# Patient Record
Sex: FEMALE | Race: BLACK OR AFRICAN AMERICAN | NOT HISPANIC OR LATINO | URBAN - METROPOLITAN AREA
[De-identification: names, ages, dates, MRNs, and addresses within clinical notes are randomized per-mention and may not be internally consistent; named-entity substitution may affect disease eponyms.]

---

## 2017-01-28 ENCOUNTER — RECORDS - HEALTHEAST (OUTPATIENT)
Dept: LAB | Facility: CLINIC | Age: 67
End: 2017-01-28

## 2017-01-28 LAB
CREAT SERPL-MCNC: 0.87 MG/DL (ref 0.6–1.1)
GFR SERPL CREATININE-BSD FRML MDRD: >60 ML/MIN/1.73M2

## 2017-01-30 ENCOUNTER — RECORDS - HEALTHEAST (OUTPATIENT)
Dept: LAB | Facility: CLINIC | Age: 67
End: 2017-01-30

## 2017-01-31 LAB
CREAT SERPL-MCNC: 0.77 MG/DL (ref 0.6–1.1)
GFR SERPL CREATININE-BSD FRML MDRD: >60 ML/MIN/1.73M2

## 2017-02-03 ENCOUNTER — RECORDS - HEALTHEAST (OUTPATIENT)
Dept: LAB | Facility: CLINIC | Age: 67
End: 2017-02-03

## 2017-02-06 LAB
CREAT SERPL-MCNC: 0.88 MG/DL (ref 0.6–1.1)
GFR SERPL CREATININE-BSD FRML MDRD: >60 ML/MIN/1.73M2

## 2017-03-07 ENCOUNTER — RECORDS - HEALTHEAST (OUTPATIENT)
Dept: LAB | Facility: CLINIC | Age: 67
End: 2017-03-07

## 2017-03-08 LAB — HBA1C MFR BLD: 8.3 % (ref 4.2–6.1)

## 2017-11-10 ENCOUNTER — RECORDS - HEALTHEAST (OUTPATIENT)
Dept: ADMINISTRATIVE | Facility: OTHER | Age: 67
End: 2017-11-10

## 2017-11-15 ENCOUNTER — RECORDS - HEALTHEAST (OUTPATIENT)
Dept: ADMINISTRATIVE | Facility: OTHER | Age: 67
End: 2017-11-15

## 2017-11-16 ENCOUNTER — RECORDS - HEALTHEAST (OUTPATIENT)
Dept: ADMINISTRATIVE | Facility: OTHER | Age: 67
End: 2017-11-16

## 2017-11-30 ENCOUNTER — OFFICE VISIT - HEALTHEAST (OUTPATIENT)
Dept: FAMILY MEDICINE | Facility: CLINIC | Age: 67
End: 2017-11-30

## 2017-11-30 ENCOUNTER — COMMUNICATION - HEALTHEAST (OUTPATIENT)
Dept: FAMILY MEDICINE | Facility: CLINIC | Age: 67
End: 2017-11-30

## 2017-11-30 ENCOUNTER — AMBULATORY - HEALTHEAST (OUTPATIENT)
Dept: CARE COORDINATION | Facility: CLINIC | Age: 67
End: 2017-11-30

## 2017-11-30 DIAGNOSIS — E11.9 DM (DIABETES MELLITUS) (H): ICD-10-CM

## 2017-11-30 DIAGNOSIS — K21.9 CHRONIC GERD: ICD-10-CM

## 2017-11-30 DIAGNOSIS — I10 HYPERTENSION: ICD-10-CM

## 2017-11-30 DIAGNOSIS — I48.91 A-FIB (H): ICD-10-CM

## 2017-11-30 DIAGNOSIS — E66.01 MORBID OBESITY (H): ICD-10-CM

## 2017-12-04 ENCOUNTER — COMMUNICATION - HEALTHEAST (OUTPATIENT)
Dept: FAMILY MEDICINE | Facility: CLINIC | Age: 67
End: 2017-12-04

## 2017-12-04 ENCOUNTER — COMMUNICATION - HEALTHEAST (OUTPATIENT)
Dept: NURSING | Facility: CLINIC | Age: 67
End: 2017-12-04

## 2017-12-04 DIAGNOSIS — G47.33 OSA (OBSTRUCTIVE SLEEP APNEA): ICD-10-CM

## 2017-12-07 ENCOUNTER — COMMUNICATION - HEALTHEAST (OUTPATIENT)
Dept: FAMILY MEDICINE | Facility: CLINIC | Age: 67
End: 2017-12-07

## 2017-12-08 ENCOUNTER — COMMUNICATION - HEALTHEAST (OUTPATIENT)
Dept: FAMILY MEDICINE | Facility: CLINIC | Age: 67
End: 2017-12-08

## 2017-12-11 ENCOUNTER — COMMUNICATION - HEALTHEAST (OUTPATIENT)
Dept: FAMILY MEDICINE | Facility: CLINIC | Age: 67
End: 2017-12-11

## 2017-12-14 ENCOUNTER — COMMUNICATION - HEALTHEAST (OUTPATIENT)
Dept: NURSING | Facility: CLINIC | Age: 67
End: 2017-12-14

## 2017-12-14 ENCOUNTER — OFFICE VISIT - HEALTHEAST (OUTPATIENT)
Dept: FAMILY MEDICINE | Facility: CLINIC | Age: 67
End: 2017-12-14

## 2017-12-14 DIAGNOSIS — E66.3 OVERWEIGHT: ICD-10-CM

## 2017-12-14 DIAGNOSIS — I10 HYPERTENSION: ICD-10-CM

## 2017-12-14 DIAGNOSIS — E11.9 DIABETES MELLITUS (H): ICD-10-CM

## 2017-12-20 ENCOUNTER — AMBULATORY - HEALTHEAST (OUTPATIENT)
Dept: CARE COORDINATION | Facility: CLINIC | Age: 67
End: 2017-12-20

## 2017-12-21 ENCOUNTER — COMMUNICATION - HEALTHEAST (OUTPATIENT)
Dept: CARE COORDINATION | Facility: CLINIC | Age: 67
End: 2017-12-21

## 2017-12-26 ENCOUNTER — COMMUNICATION - HEALTHEAST (OUTPATIENT)
Dept: FAMILY MEDICINE | Facility: CLINIC | Age: 67
End: 2017-12-26

## 2018-01-02 ENCOUNTER — COMMUNICATION - HEALTHEAST (OUTPATIENT)
Dept: FAMILY MEDICINE | Facility: CLINIC | Age: 68
End: 2018-01-02

## 2018-01-08 ENCOUNTER — COMMUNICATION - HEALTHEAST (OUTPATIENT)
Dept: FAMILY MEDICINE | Facility: CLINIC | Age: 68
End: 2018-01-08

## 2018-01-10 ENCOUNTER — COMMUNICATION - HEALTHEAST (OUTPATIENT)
Dept: FAMILY MEDICINE | Facility: CLINIC | Age: 68
End: 2018-01-10

## 2018-01-10 DIAGNOSIS — I10 HTN (HYPERTENSION): ICD-10-CM

## 2018-01-10 DIAGNOSIS — E11.9 DM (DIABETES MELLITUS) (H): ICD-10-CM

## 2018-01-10 DIAGNOSIS — E78.5 HYPERLIPIDEMIA: ICD-10-CM

## 2018-01-11 ENCOUNTER — COMMUNICATION - HEALTHEAST (OUTPATIENT)
Dept: NURSING | Facility: CLINIC | Age: 68
End: 2018-01-11

## 2018-01-11 ENCOUNTER — COMMUNICATION - HEALTHEAST (OUTPATIENT)
Dept: FAMILY MEDICINE | Facility: CLINIC | Age: 68
End: 2018-01-11

## 2018-01-12 ENCOUNTER — COMMUNICATION - HEALTHEAST (OUTPATIENT)
Dept: FAMILY MEDICINE | Facility: CLINIC | Age: 68
End: 2018-01-12

## 2018-01-12 DIAGNOSIS — K21.9 GERD (GASTROESOPHAGEAL REFLUX DISEASE): ICD-10-CM

## 2018-01-18 ENCOUNTER — COMMUNICATION - HEALTHEAST (OUTPATIENT)
Dept: NURSING | Facility: CLINIC | Age: 68
End: 2018-01-18

## 2018-01-18 ENCOUNTER — RECORDS - HEALTHEAST (OUTPATIENT)
Dept: GENERAL RADIOLOGY | Facility: CLINIC | Age: 68
End: 2018-01-18

## 2018-01-18 ENCOUNTER — OFFICE VISIT - HEALTHEAST (OUTPATIENT)
Dept: FAMILY MEDICINE | Facility: CLINIC | Age: 68
End: 2018-01-18

## 2018-01-18 ENCOUNTER — AMBULATORY - HEALTHEAST (OUTPATIENT)
Dept: PODIATRY | Age: 68
End: 2018-01-18

## 2018-01-18 DIAGNOSIS — E11.9 DIABETES MELLITUS (H): ICD-10-CM

## 2018-01-18 DIAGNOSIS — G47.33 OSA (OBSTRUCTIVE SLEEP APNEA): ICD-10-CM

## 2018-01-18 DIAGNOSIS — E11.9 TYPE 2 DIABETES MELLITUS WITHOUT COMPLICATIONS (H): ICD-10-CM

## 2018-01-18 DIAGNOSIS — M79.673 PAIN OF FOOT, UNSPECIFIED LATERALITY: ICD-10-CM

## 2018-01-18 DIAGNOSIS — E11.49 TYPE 2 DIABETES MELLITUS WITH NEUROLOGICAL MANIFESTATIONS (H): ICD-10-CM

## 2018-01-18 DIAGNOSIS — L60.2 ONYCHAUXIS: ICD-10-CM

## 2018-01-18 DIAGNOSIS — E78.5 HYPERLIPIDEMIA: ICD-10-CM

## 2018-01-18 DIAGNOSIS — E11.9 DM (DIABETES MELLITUS) (H): ICD-10-CM

## 2018-01-18 DIAGNOSIS — M25.572 LEFT ANKLE PAIN: ICD-10-CM

## 2018-01-18 DIAGNOSIS — I10 HTN (HYPERTENSION): ICD-10-CM

## 2018-01-18 DIAGNOSIS — E66.01 MORBID OBESITY (H): ICD-10-CM

## 2018-01-18 LAB
ERYTHROCYTE [DISTWIDTH] IN BLOOD BY AUTOMATED COUNT: 13.1 % (ref 11–14.5)
HBA1C MFR BLD: 7.1 % (ref 3.5–6)
HCT VFR BLD AUTO: 42.4 % (ref 35–47)
HGB BLD-MCNC: 14.2 G/DL (ref 12–16)
MCH RBC QN AUTO: 30.4 PG (ref 27–34)
MCHC RBC AUTO-ENTMCNC: 33.5 G/DL (ref 32–36)
MCV RBC AUTO: 91 FL (ref 80–100)
PLATELET # BLD AUTO: 302 THOU/UL (ref 140–440)
PMV BLD AUTO: 9 FL (ref 7–10)
RBC # BLD AUTO: 4.67 MILL/UL (ref 3.8–5.4)
WBC: 7.1 THOU/UL (ref 4–11)

## 2018-01-19 ENCOUNTER — COMMUNICATION - HEALTHEAST (OUTPATIENT)
Dept: FAMILY MEDICINE | Facility: CLINIC | Age: 68
End: 2018-01-19

## 2018-01-19 LAB
ALBUMIN SERPL-MCNC: 3.4 G/DL (ref 3.5–5)
ALP SERPL-CCNC: 161 U/L (ref 45–120)
ALT SERPL W P-5'-P-CCNC: 9 U/L (ref 0–45)
ANION GAP SERPL CALCULATED.3IONS-SCNC: 12 MMOL/L (ref 5–18)
AST SERPL W P-5'-P-CCNC: 16 U/L (ref 0–40)
BILIRUB SERPL-MCNC: 0.4 MG/DL (ref 0–1)
BUN SERPL-MCNC: 24 MG/DL (ref 8–22)
CALCIUM SERPL-MCNC: 9.4 MG/DL (ref 8.5–10.5)
CHLORIDE BLD-SCNC: 109 MMOL/L (ref 98–107)
CHOLEST SERPL-MCNC: 172 MG/DL
CO2 SERPL-SCNC: 20 MMOL/L (ref 22–31)
CREAT SERPL-MCNC: 1.14 MG/DL (ref 0.6–1.1)
FASTING STATUS PATIENT QL REPORTED: NO
GFR SERPL CREATININE-BSD FRML MDRD: 47 ML/MIN/1.73M2
GLUCOSE BLD-MCNC: 137 MG/DL (ref 70–125)
HDLC SERPL-MCNC: 50 MG/DL
LDLC SERPL CALC-MCNC: 96 MG/DL
POTASSIUM BLD-SCNC: 4.5 MMOL/L (ref 3.5–5)
PROT SERPL-MCNC: 6.9 G/DL (ref 6–8)
SODIUM SERPL-SCNC: 141 MMOL/L (ref 136–145)
TRIGL SERPL-MCNC: 131 MG/DL

## 2018-01-24 ENCOUNTER — COMMUNICATION - HEALTHEAST (OUTPATIENT)
Dept: FAMILY MEDICINE | Facility: CLINIC | Age: 68
End: 2018-01-24

## 2018-01-25 ENCOUNTER — COMMUNICATION - HEALTHEAST (OUTPATIENT)
Dept: FAMILY MEDICINE | Facility: CLINIC | Age: 68
End: 2018-01-25

## 2018-02-06 ENCOUNTER — COMMUNICATION - HEALTHEAST (OUTPATIENT)
Dept: FAMILY MEDICINE | Facility: CLINIC | Age: 68
End: 2018-02-06

## 2018-02-06 DIAGNOSIS — I10 HTN (HYPERTENSION): ICD-10-CM

## 2018-02-08 ENCOUNTER — RECORDS - HEALTHEAST (OUTPATIENT)
Dept: ADMINISTRATIVE | Facility: OTHER | Age: 68
End: 2018-02-08

## 2018-02-12 ENCOUNTER — COMMUNICATION - HEALTHEAST (OUTPATIENT)
Dept: NURSING | Facility: CLINIC | Age: 68
End: 2018-02-12

## 2018-02-12 DIAGNOSIS — E66.9 OBESITY: ICD-10-CM

## 2018-02-28 ENCOUNTER — COMMUNICATION - HEALTHEAST (OUTPATIENT)
Dept: FAMILY MEDICINE | Facility: CLINIC | Age: 68
End: 2018-02-28

## 2018-03-01 ENCOUNTER — COMMUNICATION - HEALTHEAST (OUTPATIENT)
Dept: NURSING | Facility: CLINIC | Age: 68
End: 2018-03-01

## 2018-03-07 ENCOUNTER — COMMUNICATION - HEALTHEAST (OUTPATIENT)
Dept: FAMILY MEDICINE | Facility: CLINIC | Age: 68
End: 2018-03-07

## 2018-03-23 ENCOUNTER — COMMUNICATION - HEALTHEAST (OUTPATIENT)
Dept: NURSING | Facility: CLINIC | Age: 68
End: 2018-03-23

## 2018-04-02 ENCOUNTER — COMMUNICATION - HEALTHEAST (OUTPATIENT)
Dept: FAMILY MEDICINE | Facility: CLINIC | Age: 68
End: 2018-04-02

## 2018-04-04 ENCOUNTER — COMMUNICATION - HEALTHEAST (OUTPATIENT)
Dept: FAMILY MEDICINE | Facility: CLINIC | Age: 68
End: 2018-04-04

## 2018-04-10 ENCOUNTER — COMMUNICATION - HEALTHEAST (OUTPATIENT)
Dept: FAMILY MEDICINE | Facility: CLINIC | Age: 68
End: 2018-04-10

## 2018-04-19 ENCOUNTER — OFFICE VISIT - HEALTHEAST (OUTPATIENT)
Dept: FAMILY MEDICINE | Facility: CLINIC | Age: 68
End: 2018-04-19

## 2018-04-19 DIAGNOSIS — R29.898 MUSCULAR DECONDITIONING: ICD-10-CM

## 2018-04-20 ENCOUNTER — COMMUNICATION - HEALTHEAST (OUTPATIENT)
Dept: NURSING | Facility: CLINIC | Age: 68
End: 2018-04-20

## 2018-04-27 ENCOUNTER — COMMUNICATION - HEALTHEAST (OUTPATIENT)
Dept: NURSING | Facility: CLINIC | Age: 68
End: 2018-04-27

## 2018-04-30 ENCOUNTER — COMMUNICATION - HEALTHEAST (OUTPATIENT)
Dept: FAMILY MEDICINE | Facility: CLINIC | Age: 68
End: 2018-04-30

## 2018-05-03 ENCOUNTER — COMMUNICATION - HEALTHEAST (OUTPATIENT)
Dept: NURSING | Facility: CLINIC | Age: 68
End: 2018-05-03

## 2018-05-03 ENCOUNTER — OFFICE VISIT - HEALTHEAST (OUTPATIENT)
Dept: FAMILY MEDICINE | Facility: CLINIC | Age: 68
End: 2018-05-03

## 2018-05-03 DIAGNOSIS — E11.9 DM (DIABETES MELLITUS) (H): ICD-10-CM

## 2018-05-03 DIAGNOSIS — I10 HTN (HYPERTENSION): ICD-10-CM

## 2018-05-03 DIAGNOSIS — E78.5 HYPERLIPIDEMIA: ICD-10-CM

## 2018-05-03 DIAGNOSIS — R32 URINARY INCONTINENCE: ICD-10-CM

## 2018-05-03 DIAGNOSIS — E66.01 MORBID OBESITY (H): ICD-10-CM

## 2018-05-03 LAB
ANION GAP SERPL CALCULATED.3IONS-SCNC: 10 MMOL/L (ref 5–18)
BUN SERPL-MCNC: 21 MG/DL (ref 8–22)
CALCIUM SERPL-MCNC: 8.9 MG/DL (ref 8.5–10.5)
CHLORIDE BLD-SCNC: 109 MMOL/L (ref 98–107)
CHOLEST SERPL-MCNC: 180 MG/DL
CO2 SERPL-SCNC: 22 MMOL/L (ref 22–31)
CREAT SERPL-MCNC: 0.93 MG/DL (ref 0.6–1.1)
ERYTHROCYTE [DISTWIDTH] IN BLOOD BY AUTOMATED COUNT: 12.9 % (ref 11–14.5)
FASTING STATUS PATIENT QL REPORTED: ABNORMAL
GFR SERPL CREATININE-BSD FRML MDRD: 60 ML/MIN/1.73M2
GLUCOSE BLD-MCNC: 180 MG/DL (ref 70–125)
HBA1C MFR BLD: 7.7 % (ref 3.5–6)
HCT VFR BLD AUTO: 40.1 % (ref 35–47)
HDLC SERPL-MCNC: 44 MG/DL
HGB BLD-MCNC: 13.6 G/DL (ref 12–16)
LDLC SERPL CALC-MCNC: 107 MG/DL
MCH RBC QN AUTO: 30.5 PG (ref 27–34)
MCHC RBC AUTO-ENTMCNC: 33.9 G/DL (ref 32–36)
MCV RBC AUTO: 90 FL (ref 80–100)
PLATELET # BLD AUTO: 239 THOU/UL (ref 140–440)
PMV BLD AUTO: 9 FL (ref 7–10)
POTASSIUM BLD-SCNC: 4.1 MMOL/L (ref 3.5–5)
RBC # BLD AUTO: 4.45 MILL/UL (ref 3.8–5.4)
SODIUM SERPL-SCNC: 141 MMOL/L (ref 136–145)
TRIGL SERPL-MCNC: 145 MG/DL
WBC: 7.3 THOU/UL (ref 4–11)

## 2018-05-03 ASSESSMENT — MIFFLIN-ST. JEOR: SCORE: 2260.81

## 2018-05-08 ENCOUNTER — COMMUNICATION - HEALTHEAST (OUTPATIENT)
Dept: ADMINISTRATIVE | Facility: CLINIC | Age: 68
End: 2018-05-08

## 2018-05-09 ENCOUNTER — COMMUNICATION - HEALTHEAST (OUTPATIENT)
Dept: FAMILY MEDICINE | Facility: CLINIC | Age: 68
End: 2018-05-09

## 2018-05-14 ENCOUNTER — COMMUNICATION - HEALTHEAST (OUTPATIENT)
Dept: FAMILY MEDICINE | Facility: CLINIC | Age: 68
End: 2018-05-14

## 2018-05-17 ENCOUNTER — COMMUNICATION - HEALTHEAST (OUTPATIENT)
Dept: FAMILY MEDICINE | Facility: CLINIC | Age: 68
End: 2018-05-17

## 2018-05-18 ENCOUNTER — COMMUNICATION - HEALTHEAST (OUTPATIENT)
Dept: ADMINISTRATIVE | Facility: CLINIC | Age: 68
End: 2018-05-18

## 2018-05-18 ENCOUNTER — COMMUNICATION - HEALTHEAST (OUTPATIENT)
Dept: FAMILY MEDICINE | Facility: CLINIC | Age: 68
End: 2018-05-18

## 2018-05-23 ENCOUNTER — COMMUNICATION - HEALTHEAST (OUTPATIENT)
Dept: FAMILY MEDICINE | Facility: CLINIC | Age: 68
End: 2018-05-23

## 2018-05-23 ENCOUNTER — COMMUNICATION - HEALTHEAST (OUTPATIENT)
Dept: SCHEDULING | Facility: CLINIC | Age: 68
End: 2018-05-23

## 2018-05-31 ENCOUNTER — COMMUNICATION - HEALTHEAST (OUTPATIENT)
Dept: INTERNAL MEDICINE | Facility: CLINIC | Age: 68
End: 2018-05-31

## 2018-05-31 ENCOUNTER — OFFICE VISIT - HEALTHEAST (OUTPATIENT)
Dept: INTERNAL MEDICINE | Facility: CLINIC | Age: 68
End: 2018-05-31

## 2018-05-31 ENCOUNTER — RECORDS - HEALTHEAST (OUTPATIENT)
Dept: ADMINISTRATIVE | Facility: OTHER | Age: 68
End: 2018-05-31

## 2018-05-31 DIAGNOSIS — E11.9 DM (DIABETES MELLITUS) (H): ICD-10-CM

## 2018-05-31 DIAGNOSIS — I10 ESSENTIAL HYPERTENSION: ICD-10-CM

## 2018-05-31 DIAGNOSIS — L98.9 SKIN LESIONS: ICD-10-CM

## 2018-05-31 DIAGNOSIS — R41.3 MEMORY LOSS: ICD-10-CM

## 2018-05-31 DIAGNOSIS — E78.5 HYPERLIPIDEMIA, UNSPECIFIED HYPERLIPIDEMIA TYPE: ICD-10-CM

## 2018-05-31 LAB
ALBUMIN SERPL-MCNC: 3.1 G/DL (ref 3.5–5)
ALP SERPL-CCNC: 172 U/L (ref 45–120)
ALT SERPL W P-5'-P-CCNC: 10 U/L (ref 0–45)
ANION GAP SERPL CALCULATED.3IONS-SCNC: 13 MMOL/L (ref 5–18)
AST SERPL W P-5'-P-CCNC: 12 U/L (ref 0–40)
BILIRUB SERPL-MCNC: 0.4 MG/DL (ref 0–1)
BUN SERPL-MCNC: 21 MG/DL (ref 8–22)
CALCIUM SERPL-MCNC: 8.7 MG/DL (ref 8.5–10.5)
CHLORIDE BLD-SCNC: 106 MMOL/L (ref 98–107)
CHOLEST SERPL-MCNC: 185 MG/DL
CO2 SERPL-SCNC: 21 MMOL/L (ref 22–31)
CREAT SERPL-MCNC: 1.24 MG/DL (ref 0.6–1.1)
FASTING STATUS PATIENT QL REPORTED: YES
GFR SERPL CREATININE-BSD FRML MDRD: 43 ML/MIN/1.73M2
GLUCOSE BLD-MCNC: 361 MG/DL (ref 70–125)
HBA1C MFR BLD: 8.7 % (ref 3.5–6)
HDLC SERPL-MCNC: 39 MG/DL
LDLC SERPL CALC-MCNC: 108 MG/DL
POTASSIUM BLD-SCNC: 4.1 MMOL/L (ref 3.5–5)
PROT SERPL-MCNC: 6.8 G/DL (ref 6–8)
SODIUM SERPL-SCNC: 140 MMOL/L (ref 136–145)
TRIGL SERPL-MCNC: 188 MG/DL

## 2018-06-01 ENCOUNTER — COMMUNICATION - HEALTHEAST (OUTPATIENT)
Dept: INTERNAL MEDICINE | Facility: CLINIC | Age: 68
End: 2018-06-01

## 2018-06-05 ENCOUNTER — COMMUNICATION - HEALTHEAST (OUTPATIENT)
Dept: NURSING | Facility: CLINIC | Age: 68
End: 2018-06-05

## 2018-06-07 ENCOUNTER — RECORDS - HEALTHEAST (OUTPATIENT)
Dept: ADMINISTRATIVE | Facility: OTHER | Age: 68
End: 2018-06-07

## 2018-06-08 ENCOUNTER — COMMUNICATION - HEALTHEAST (OUTPATIENT)
Dept: FAMILY MEDICINE | Facility: CLINIC | Age: 68
End: 2018-06-08

## 2018-06-08 ENCOUNTER — AMBULATORY - HEALTHEAST (OUTPATIENT)
Dept: INTERNAL MEDICINE | Facility: CLINIC | Age: 68
End: 2018-06-08

## 2018-06-08 ENCOUNTER — COMMUNICATION - HEALTHEAST (OUTPATIENT)
Dept: INTERNAL MEDICINE | Facility: CLINIC | Age: 68
End: 2018-06-08

## 2018-07-19 ENCOUNTER — COMMUNICATION - HEALTHEAST (OUTPATIENT)
Dept: INTERNAL MEDICINE | Facility: CLINIC | Age: 68
End: 2018-07-19

## 2018-07-19 DIAGNOSIS — I10 HTN (HYPERTENSION): ICD-10-CM

## 2018-07-19 DIAGNOSIS — E11.9 DM (DIABETES MELLITUS) (H): ICD-10-CM

## 2018-07-21 ENCOUNTER — COMMUNICATION - HEALTHEAST (OUTPATIENT)
Dept: INTERNAL MEDICINE | Facility: CLINIC | Age: 68
End: 2018-07-21

## 2018-07-21 DIAGNOSIS — I10 HTN (HYPERTENSION): ICD-10-CM

## 2018-07-21 DIAGNOSIS — E11.9 DM (DIABETES MELLITUS) (H): ICD-10-CM

## 2018-08-01 ENCOUNTER — COMMUNICATION - HEALTHEAST (OUTPATIENT)
Dept: INTERNAL MEDICINE | Facility: CLINIC | Age: 68
End: 2018-08-01

## 2018-08-06 ENCOUNTER — COMMUNICATION - HEALTHEAST (OUTPATIENT)
Dept: FAMILY MEDICINE | Facility: CLINIC | Age: 68
End: 2018-08-06

## 2018-08-08 ENCOUNTER — COMMUNICATION - HEALTHEAST (OUTPATIENT)
Dept: FAMILY MEDICINE | Facility: CLINIC | Age: 68
End: 2018-08-08

## 2018-08-22 ENCOUNTER — RECORDS - HEALTHEAST (OUTPATIENT)
Dept: ADMINISTRATIVE | Facility: OTHER | Age: 68
End: 2018-08-22

## 2018-08-24 ENCOUNTER — COMMUNICATION - HEALTHEAST (OUTPATIENT)
Dept: INTERNAL MEDICINE | Facility: CLINIC | Age: 68
End: 2018-08-24

## 2018-08-24 DIAGNOSIS — I10 HTN (HYPERTENSION): ICD-10-CM

## 2018-09-05 ENCOUNTER — COMMUNICATION - HEALTHEAST (OUTPATIENT)
Dept: INTERNAL MEDICINE | Facility: CLINIC | Age: 68
End: 2018-09-05

## 2018-09-18 ENCOUNTER — COMMUNICATION - HEALTHEAST (OUTPATIENT)
Dept: INTERNAL MEDICINE | Facility: CLINIC | Age: 68
End: 2018-09-18

## 2018-09-18 ENCOUNTER — AMBULATORY - HEALTHEAST (OUTPATIENT)
Dept: INTERNAL MEDICINE | Facility: CLINIC | Age: 68
End: 2018-09-18

## 2018-09-18 DIAGNOSIS — B37.2 YEAST INFECTION OF THE SKIN: ICD-10-CM

## 2018-09-20 ENCOUNTER — RECORDS - HEALTHEAST (OUTPATIENT)
Dept: ADMINISTRATIVE | Facility: OTHER | Age: 68
End: 2018-09-20

## 2018-10-01 ENCOUNTER — COMMUNICATION - HEALTHEAST (OUTPATIENT)
Dept: FAMILY MEDICINE | Facility: CLINIC | Age: 68
End: 2018-10-01

## 2018-10-03 ENCOUNTER — COMMUNICATION - HEALTHEAST (OUTPATIENT)
Dept: FAMILY MEDICINE | Facility: CLINIC | Age: 68
End: 2018-10-03

## 2018-10-03 DIAGNOSIS — E78.5 HYPERLIPIDEMIA: ICD-10-CM

## 2018-10-05 ENCOUNTER — RECORDS - HEALTHEAST (OUTPATIENT)
Dept: ADMINISTRATIVE | Facility: OTHER | Age: 68
End: 2018-10-05

## 2018-10-25 ENCOUNTER — COMMUNICATION - HEALTHEAST (OUTPATIENT)
Dept: SCHEDULING | Facility: CLINIC | Age: 68
End: 2018-10-25

## 2018-10-29 ENCOUNTER — COMMUNICATION - HEALTHEAST (OUTPATIENT)
Dept: INTERNAL MEDICINE | Facility: CLINIC | Age: 68
End: 2018-10-29

## 2018-10-29 ENCOUNTER — OFFICE VISIT - HEALTHEAST (OUTPATIENT)
Dept: INTERNAL MEDICINE | Facility: CLINIC | Age: 68
End: 2018-10-29

## 2018-10-29 DIAGNOSIS — R32 URINARY INCONTINENCE: ICD-10-CM

## 2018-10-29 DIAGNOSIS — E78.5 HYPERLIPIDEMIA, UNSPECIFIED HYPERLIPIDEMIA TYPE: ICD-10-CM

## 2018-10-29 DIAGNOSIS — E11.9 DM (DIABETES MELLITUS) (H): ICD-10-CM

## 2018-10-29 DIAGNOSIS — I10 ESSENTIAL HYPERTENSION: ICD-10-CM

## 2018-10-29 LAB — HBA1C MFR BLD: 10 % (ref 3.5–6)

## 2018-10-29 ASSESSMENT — MIFFLIN-ST. JEOR: SCORE: 2259.96

## 2018-10-30 LAB
ALBUMIN SERPL-MCNC: 3.3 G/DL (ref 3.5–5)
ALP SERPL-CCNC: 154 U/L (ref 45–120)
ALT SERPL W P-5'-P-CCNC: <9 U/L (ref 0–45)
ANION GAP SERPL CALCULATED.3IONS-SCNC: 15 MMOL/L (ref 5–18)
AST SERPL W P-5'-P-CCNC: 12 U/L (ref 0–40)
BILIRUB SERPL-MCNC: 0.5 MG/DL (ref 0–1)
BUN SERPL-MCNC: 24 MG/DL (ref 8–22)
CALCIUM SERPL-MCNC: 9.1 MG/DL (ref 8.5–10.5)
CHLORIDE BLD-SCNC: 105 MMOL/L (ref 98–107)
CHOLEST SERPL-MCNC: 176 MG/DL
CO2 SERPL-SCNC: 19 MMOL/L (ref 22–31)
CREAT SERPL-MCNC: 1.57 MG/DL (ref 0.6–1.1)
FASTING STATUS PATIENT QL REPORTED: YES
GFR SERPL CREATININE-BSD FRML MDRD: 33 ML/MIN/1.73M2
GLUCOSE BLD-MCNC: 348 MG/DL (ref 70–125)
HDLC SERPL-MCNC: 40 MG/DL
LDLC SERPL CALC-MCNC: 107 MG/DL
POTASSIUM BLD-SCNC: 4.4 MMOL/L (ref 3.5–5)
PROT SERPL-MCNC: 6.8 G/DL (ref 6–8)
SODIUM SERPL-SCNC: 139 MMOL/L (ref 136–145)
TRIGL SERPL-MCNC: 144 MG/DL

## 2018-10-31 ENCOUNTER — COMMUNICATION - HEALTHEAST (OUTPATIENT)
Dept: INTERNAL MEDICINE | Facility: CLINIC | Age: 68
End: 2018-10-31

## 2018-11-28 ENCOUNTER — RECORDS - HEALTHEAST (OUTPATIENT)
Dept: ADMINISTRATIVE | Facility: OTHER | Age: 68
End: 2018-11-28

## 2018-11-30 ENCOUNTER — RECORDS - HEALTHEAST (OUTPATIENT)
Dept: ADMINISTRATIVE | Facility: OTHER | Age: 68
End: 2018-11-30

## 2018-12-10 ENCOUNTER — COMMUNICATION - HEALTHEAST (OUTPATIENT)
Dept: SCHEDULING | Facility: CLINIC | Age: 68
End: 2018-12-10

## 2018-12-14 ENCOUNTER — COMMUNICATION - HEALTHEAST (OUTPATIENT)
Dept: INTERNAL MEDICINE | Facility: CLINIC | Age: 68
End: 2018-12-14

## 2019-01-06 ENCOUNTER — COMMUNICATION - HEALTHEAST (OUTPATIENT)
Dept: FAMILY MEDICINE | Facility: CLINIC | Age: 69
End: 2019-01-06

## 2019-01-06 DIAGNOSIS — E78.5 HYPERLIPIDEMIA: ICD-10-CM

## 2019-01-08 ENCOUNTER — COMMUNICATION - HEALTHEAST (OUTPATIENT)
Dept: INTERNAL MEDICINE | Facility: CLINIC | Age: 69
End: 2019-01-08

## 2019-01-08 ENCOUNTER — COMMUNICATION - HEALTHEAST (OUTPATIENT)
Dept: NURSING | Facility: CLINIC | Age: 69
End: 2019-01-08

## 2019-01-08 ENCOUNTER — RECORDS - HEALTHEAST (OUTPATIENT)
Dept: ADMINISTRATIVE | Facility: OTHER | Age: 69
End: 2019-01-08

## 2019-01-09 ENCOUNTER — AMBULATORY - HEALTHEAST (OUTPATIENT)
Dept: INTERNAL MEDICINE | Facility: CLINIC | Age: 69
End: 2019-01-09

## 2019-01-09 DIAGNOSIS — R32 URINARY INCONTINENCE: ICD-10-CM

## 2019-01-11 ENCOUNTER — RECORDS - HEALTHEAST (OUTPATIENT)
Dept: ADMINISTRATIVE | Facility: OTHER | Age: 69
End: 2019-01-11

## 2019-01-23 ENCOUNTER — COMMUNICATION - HEALTHEAST (OUTPATIENT)
Dept: INTERNAL MEDICINE | Facility: CLINIC | Age: 69
End: 2019-01-23

## 2019-01-30 ENCOUNTER — COMMUNICATION - HEALTHEAST (OUTPATIENT)
Dept: INTERNAL MEDICINE | Facility: CLINIC | Age: 69
End: 2019-01-30

## 2019-01-31 ENCOUNTER — COMMUNICATION - HEALTHEAST (OUTPATIENT)
Dept: INTERNAL MEDICINE | Facility: CLINIC | Age: 69
End: 2019-01-31

## 2019-02-01 ENCOUNTER — COMMUNICATION - HEALTHEAST (OUTPATIENT)
Dept: NURSING | Facility: CLINIC | Age: 69
End: 2019-02-01

## 2019-02-14 ENCOUNTER — COMMUNICATION - HEALTHEAST (OUTPATIENT)
Dept: NURSING | Facility: CLINIC | Age: 69
End: 2019-02-14

## 2019-03-06 ENCOUNTER — COMMUNICATION - HEALTHEAST (OUTPATIENT)
Dept: NURSING | Facility: CLINIC | Age: 69
End: 2019-03-06

## 2019-03-14 ENCOUNTER — COMMUNICATION - HEALTHEAST (OUTPATIENT)
Dept: NURSING | Facility: CLINIC | Age: 69
End: 2019-03-14

## 2019-04-03 ENCOUNTER — COMMUNICATION - HEALTHEAST (OUTPATIENT)
Dept: INTERNAL MEDICINE | Facility: CLINIC | Age: 69
End: 2019-04-03

## 2019-04-03 DIAGNOSIS — E11.9 DM (DIABETES MELLITUS) (H): ICD-10-CM

## 2019-04-03 DIAGNOSIS — I10 HTN (HYPERTENSION): ICD-10-CM

## 2019-04-04 ENCOUNTER — COMMUNICATION - HEALTHEAST (OUTPATIENT)
Dept: INTERNAL MEDICINE | Facility: CLINIC | Age: 69
End: 2019-04-04

## 2019-04-04 DIAGNOSIS — I10 HTN (HYPERTENSION): ICD-10-CM

## 2019-04-18 ENCOUNTER — COMMUNICATION - HEALTHEAST (OUTPATIENT)
Dept: NURSING | Facility: CLINIC | Age: 69
End: 2019-04-18

## 2019-04-19 ENCOUNTER — RECORDS - HEALTHEAST (OUTPATIENT)
Dept: ADMINISTRATIVE | Facility: OTHER | Age: 69
End: 2019-04-19

## 2019-04-25 ENCOUNTER — COMMUNICATION - HEALTHEAST (OUTPATIENT)
Dept: INTERNAL MEDICINE | Facility: CLINIC | Age: 69
End: 2019-04-25

## 2019-04-29 ENCOUNTER — COMMUNICATION - HEALTHEAST (OUTPATIENT)
Dept: INTERNAL MEDICINE | Facility: CLINIC | Age: 69
End: 2019-04-29

## 2019-05-03 ENCOUNTER — RECORDS - HEALTHEAST (OUTPATIENT)
Dept: ADMINISTRATIVE | Facility: OTHER | Age: 69
End: 2019-05-03

## 2019-05-10 ENCOUNTER — RECORDS - HEALTHEAST (OUTPATIENT)
Dept: ADMINISTRATIVE | Facility: OTHER | Age: 69
End: 2019-05-10

## 2019-05-16 ENCOUNTER — COMMUNICATION - HEALTHEAST (OUTPATIENT)
Dept: NURSING | Facility: CLINIC | Age: 69
End: 2019-05-16

## 2019-06-03 ENCOUNTER — RECORDS - HEALTHEAST (OUTPATIENT)
Dept: ADMINISTRATIVE | Facility: OTHER | Age: 69
End: 2019-06-03

## 2019-06-11 ENCOUNTER — RECORDS - HEALTHEAST (OUTPATIENT)
Dept: LAB | Facility: CLINIC | Age: 69
End: 2019-06-11

## 2019-06-11 LAB
ALBUMIN SERPL-MCNC: 2.8 G/DL (ref 3.5–5)
ALP SERPL-CCNC: 91 U/L (ref 45–120)
ALT SERPL W P-5'-P-CCNC: <9 U/L (ref 0–45)
ANION GAP SERPL CALCULATED.3IONS-SCNC: 10 MMOL/L (ref 5–18)
AST SERPL W P-5'-P-CCNC: 13 U/L (ref 0–40)
BILIRUB SERPL-MCNC: 0.3 MG/DL (ref 0–1)
BUN SERPL-MCNC: 56 MG/DL (ref 8–22)
CALCIUM SERPL-MCNC: 9.2 MG/DL (ref 8.5–10.5)
CHLORIDE BLD-SCNC: 98 MMOL/L (ref 98–107)
CO2 SERPL-SCNC: 34 MMOL/L (ref 22–31)
CREAT SERPL-MCNC: 1.38 MG/DL (ref 0.6–1.1)
ERYTHROCYTE [DISTWIDTH] IN BLOOD BY AUTOMATED COUNT: 15 % (ref 11–14.5)
GFR SERPL CREATININE-BSD FRML MDRD: 38 ML/MIN/1.73M2
GLUCOSE BLD-MCNC: 151 MG/DL (ref 70–125)
HCT VFR BLD AUTO: 34.5 % (ref 35–47)
HGB BLD-MCNC: 10.5 G/DL (ref 12–16)
MCH RBC QN AUTO: 28.7 PG (ref 27–34)
MCHC RBC AUTO-ENTMCNC: 30.4 G/DL (ref 32–36)
MCV RBC AUTO: 94 FL (ref 80–100)
PLATELET # BLD AUTO: 268 THOU/UL (ref 140–440)
PMV BLD AUTO: 11.8 FL (ref 8.5–12.5)
POTASSIUM BLD-SCNC: 3.7 MMOL/L (ref 3.5–5)
PROT SERPL-MCNC: 6.7 G/DL (ref 6–8)
RBC # BLD AUTO: 3.66 MILL/UL (ref 3.8–5.4)
SODIUM SERPL-SCNC: 142 MMOL/L (ref 136–145)
WBC: 5.5 THOU/UL (ref 4–11)

## 2019-07-06 ENCOUNTER — AMBULATORY - HEALTHEAST (OUTPATIENT)
Dept: MULTI SPECIALTY CLINIC | Facility: CLINIC | Age: 69
End: 2019-07-06

## 2019-07-06 LAB — HBA1C MFR BLD: 7.4 % (ref 0–5.6)

## 2019-07-23 ENCOUNTER — RECORDS - HEALTHEAST (OUTPATIENT)
Dept: LAB | Facility: CLINIC | Age: 69
End: 2019-07-23

## 2019-07-23 LAB
ALBUMIN SERPL-MCNC: 2.7 G/DL (ref 3.5–5)
ALP SERPL-CCNC: 82 U/L (ref 45–120)
ALT SERPL W P-5'-P-CCNC: <9 U/L (ref 0–45)
ANION GAP SERPL CALCULATED.3IONS-SCNC: 7 MMOL/L (ref 5–18)
AST SERPL W P-5'-P-CCNC: 11 U/L (ref 0–40)
BILIRUB SERPL-MCNC: 0.3 MG/DL (ref 0–1)
BUN SERPL-MCNC: 49 MG/DL (ref 8–22)
CALCIUM SERPL-MCNC: 8.4 MG/DL (ref 8.5–10.5)
CHLORIDE BLD-SCNC: 99 MMOL/L (ref 98–107)
CO2 SERPL-SCNC: 38 MMOL/L (ref 22–31)
CREAT SERPL-MCNC: 1.14 MG/DL (ref 0.6–1.1)
GFR SERPL CREATININE-BSD FRML MDRD: 47 ML/MIN/1.73M2
GLUCOSE BLD-MCNC: 102 MG/DL (ref 70–125)
POTASSIUM BLD-SCNC: 4.4 MMOL/L (ref 3.5–5)
PROT SERPL-MCNC: 6 G/DL (ref 6–8)
SODIUM SERPL-SCNC: 144 MMOL/L (ref 136–145)

## 2019-07-31 ENCOUNTER — RECORDS - HEALTHEAST (OUTPATIENT)
Dept: LAB | Facility: CLINIC | Age: 69
End: 2019-07-31

## 2019-07-31 LAB
ALBUMIN SERPL-MCNC: 2.7 G/DL (ref 3.5–5)
ALP SERPL-CCNC: 89 U/L (ref 45–120)
ALT SERPL W P-5'-P-CCNC: <9 U/L (ref 0–45)
ANION GAP SERPL CALCULATED.3IONS-SCNC: 9 MMOL/L (ref 5–18)
AST SERPL W P-5'-P-CCNC: 11 U/L (ref 0–40)
BILIRUB SERPL-MCNC: 0.3 MG/DL (ref 0–1)
BUN SERPL-MCNC: 54 MG/DL (ref 8–22)
CALCIUM SERPL-MCNC: 9 MG/DL (ref 8.5–10.5)
CHLORIDE BLD-SCNC: 99 MMOL/L (ref 98–107)
CO2 SERPL-SCNC: 36 MMOL/L (ref 22–31)
CREAT SERPL-MCNC: 1.3 MG/DL (ref 0.6–1.1)
GFR SERPL CREATININE-BSD FRML MDRD: 41 ML/MIN/1.73M2
GLUCOSE BLD-MCNC: 117 MG/DL (ref 70–125)
POTASSIUM BLD-SCNC: 3.9 MMOL/L (ref 3.5–5)
PROT SERPL-MCNC: 6.4 G/DL (ref 6–8)
SODIUM SERPL-SCNC: 144 MMOL/L (ref 136–145)

## 2019-08-02 ENCOUNTER — RECORDS - HEALTHEAST (OUTPATIENT)
Dept: LAB | Facility: CLINIC | Age: 69
End: 2019-08-02

## 2019-08-02 LAB
ALBUMIN SERPL-MCNC: 3 G/DL (ref 3.5–5)
ALP SERPL-CCNC: 91 U/L (ref 45–120)
ALT SERPL W P-5'-P-CCNC: <9 U/L (ref 0–45)
ANION GAP SERPL CALCULATED.3IONS-SCNC: 8 MMOL/L (ref 5–18)
AST SERPL W P-5'-P-CCNC: 13 U/L (ref 0–40)
BILIRUB SERPL-MCNC: 0.3 MG/DL (ref 0–1)
BUN SERPL-MCNC: 59 MG/DL (ref 8–22)
CALCIUM SERPL-MCNC: 9 MG/DL (ref 8.5–10.5)
CHLORIDE BLD-SCNC: 98 MMOL/L (ref 98–107)
CO2 SERPL-SCNC: 36 MMOL/L (ref 22–31)
CREAT SERPL-MCNC: 1.58 MG/DL (ref 0.6–1.1)
GFR SERPL CREATININE-BSD FRML MDRD: 32 ML/MIN/1.73M2
GLUCOSE BLD-MCNC: 120 MG/DL (ref 70–125)
POTASSIUM BLD-SCNC: 4 MMOL/L (ref 3.5–5)
PROT SERPL-MCNC: 6.7 G/DL (ref 6–8)
SODIUM SERPL-SCNC: 142 MMOL/L (ref 136–145)

## 2019-08-05 ENCOUNTER — RECORDS - HEALTHEAST (OUTPATIENT)
Dept: LAB | Facility: CLINIC | Age: 69
End: 2019-08-05

## 2019-08-05 LAB
ANION GAP SERPL CALCULATED.3IONS-SCNC: 10 MMOL/L (ref 5–18)
BUN SERPL-MCNC: 60 MG/DL (ref 8–22)
CALCIUM SERPL-MCNC: 9.1 MG/DL (ref 8.5–10.5)
CHLORIDE BLD-SCNC: 98 MMOL/L (ref 98–107)
CO2 SERPL-SCNC: 35 MMOL/L (ref 22–31)
CREAT SERPL-MCNC: 1.4 MG/DL (ref 0.6–1.1)
GFR SERPL CREATININE-BSD FRML MDRD: 37 ML/MIN/1.73M2
GLUCOSE BLD-MCNC: 153 MG/DL (ref 70–125)
POTASSIUM BLD-SCNC: 3.9 MMOL/L (ref 3.5–5)
SODIUM SERPL-SCNC: 143 MMOL/L (ref 136–145)

## 2019-08-13 ENCOUNTER — COMMUNICATION - HEALTHEAST (OUTPATIENT)
Dept: INTERNAL MEDICINE | Facility: CLINIC | Age: 69
End: 2019-08-13

## 2019-08-14 ENCOUNTER — OFFICE VISIT - HEALTHEAST (OUTPATIENT)
Dept: INTERNAL MEDICINE | Facility: CLINIC | Age: 69
End: 2019-08-14

## 2019-08-14 DIAGNOSIS — J44.9 CHRONIC OBSTRUCTIVE PULMONARY DISEASE, UNSPECIFIED COPD TYPE (H): ICD-10-CM

## 2019-08-14 DIAGNOSIS — I10 ESSENTIAL HYPERTENSION: ICD-10-CM

## 2019-08-14 DIAGNOSIS — E11.9 TYPE 2 DIABETES MELLITUS WITHOUT COMPLICATION, WITH LONG-TERM CURRENT USE OF INSULIN (H): ICD-10-CM

## 2019-08-14 DIAGNOSIS — Z79.4 TYPE 2 DIABETES MELLITUS WITHOUT COMPLICATION, WITH LONG-TERM CURRENT USE OF INSULIN (H): ICD-10-CM

## 2019-08-20 ENCOUNTER — COMMUNICATION - HEALTHEAST (OUTPATIENT)
Dept: SCHEDULING | Facility: CLINIC | Age: 69
End: 2019-08-20

## 2019-08-22 ENCOUNTER — COMMUNICATION - HEALTHEAST (OUTPATIENT)
Dept: CARE COORDINATION | Facility: CLINIC | Age: 69
End: 2019-08-22

## 2019-08-22 ENCOUNTER — COMMUNICATION - HEALTHEAST (OUTPATIENT)
Dept: SCHEDULING | Facility: CLINIC | Age: 69
End: 2019-08-22

## 2019-08-23 ENCOUNTER — COMMUNICATION - HEALTHEAST (OUTPATIENT)
Dept: INTERNAL MEDICINE | Facility: CLINIC | Age: 69
End: 2019-08-23

## 2019-08-23 ENCOUNTER — COMMUNICATION - HEALTHEAST (OUTPATIENT)
Dept: NURSING | Facility: CLINIC | Age: 69
End: 2019-08-23

## 2019-08-23 ENCOUNTER — OFFICE VISIT - HEALTHEAST (OUTPATIENT)
Dept: INTERNAL MEDICINE | Facility: CLINIC | Age: 69
End: 2019-08-23

## 2019-08-23 ENCOUNTER — COMMUNICATION - HEALTHEAST (OUTPATIENT)
Dept: CARE COORDINATION | Facility: CLINIC | Age: 69
End: 2019-08-23

## 2019-08-23 DIAGNOSIS — Z79.4 TYPE 2 DIABETES MELLITUS WITHOUT COMPLICATION, WITH LONG-TERM CURRENT USE OF INSULIN (H): ICD-10-CM

## 2019-08-23 DIAGNOSIS — E66.01 MORBID OBESITY (H): ICD-10-CM

## 2019-08-23 DIAGNOSIS — J96.11 CHRONIC RESPIRATORY FAILURE WITH HYPOXIA (H): ICD-10-CM

## 2019-08-23 DIAGNOSIS — I10 ESSENTIAL HYPERTENSION: ICD-10-CM

## 2019-08-23 DIAGNOSIS — N18.30 CHRONIC KIDNEY DISEASE, STAGE III (MODERATE) (H): ICD-10-CM

## 2019-08-23 DIAGNOSIS — E11.9 TYPE 2 DIABETES MELLITUS WITHOUT COMPLICATION, WITH LONG-TERM CURRENT USE OF INSULIN (H): ICD-10-CM

## 2019-08-23 ASSESSMENT — MIFFLIN-ST. JEOR: SCORE: 2259.96

## 2019-08-29 ENCOUNTER — COMMUNICATION - HEALTHEAST (OUTPATIENT)
Dept: INTERNAL MEDICINE | Facility: CLINIC | Age: 69
End: 2019-08-29

## 2021-05-27 NOTE — PROGRESS NOTES
Care Guide called patient.  If this patient is returning my call, please transfer to Leonor at ext [phone].  I have called Cele and have been unsuccessful in reaching this patient for 2 months now.  This patient has also not returned any of my messages.  I will continue attempting to reach out to this patient in one month.  I will also check this patient's chart for upcoming appointments, ER reports that may contain a new phone number, or any other recent activity.     Next outreach due: 5/15/19

## 2021-05-27 NOTE — TELEPHONE ENCOUNTER
Refill Approved    Rx renewed per Medication Renewal Policy. Medication was last renewed on 7/21/18.    Chasity Pham, Care Connection Triage/Med Refill 4/4/2019     Requested Prescriptions   Pending Prescriptions Disp Refills     gabapentin (NEURONTIN) 100 MG capsule [Pharmacy Med Name: GABAPENTIN 100MG CAPSULES] 60 capsule 0     Sig: TAKE 1 CAPSULE BY MOUTH TWICE DAILY    Gabapentin/Levetiracetam/Tiagabine Refill Protocol  Passed - 4/3/2019 12:50 PM       Passed - PCP or prescribing provider visit in past 12 months or next 3 months    Last office visit with prescriber/PCP: 10/29/2018 Hamzah Zhao MD OR same dept: 10/29/2018 Hamzah Zhao MD OR same specialty: 10/29/2018 Hamzah Zhao MD  Last physical: Visit date not found Last MTM visit: Visit date not found   Next visit within 3 mo: Visit date not found  Next physical within 3 mo: Visit date not found  Prescriber OR PCP: Hamzah Zhao MD  Last diagnosis associated with med order: 1. DM (diabetes mellitus) (H)  - gabapentin (NEURONTIN) 100 MG capsule [Pharmacy Med Name: GABAPENTIN 100MG CAPSULES]; TAKE 1 CAPSULE BY MOUTH TWICE DAILY  Dispense: 60 capsule; Refill: 0    2. HTN (hypertension)  - metoprolol tartrate (LOPRESSOR) 50 MG tablet [Pharmacy Med Name: METOPROLOL TARTRATE 50MG TABLETS]; TAKE ONE TABLET BY MOUTH ONCE DAILY AT 8 AM  Dispense: 90 tablet; Refill: 0    If protocol passes may refill for 12 months if within 3 months of last provider visit (or a total of 15 months).             metoprolol tartrate (LOPRESSOR) 50 MG tablet [Pharmacy Med Name: METOPROLOL TARTRATE 50MG TABLETS] 90 tablet 0     Sig: TAKE ONE TABLET BY MOUTH ONCE DAILY AT 8 AM    Beta-Blockers Refill Protocol Passed - 4/3/2019 12:50 PM       Passed - PCP or prescribing provider visit in past 12 months or next 3 months    Last office visit with prescriber/PCP: 10/29/2018 Hamzah Zhao MD OR same dept: 10/29/2018 Hamzah Zhao MD OR same specialty: 10/29/2018 Cece  Hamzah MICHAELS MD  Last physical: Visit date not found Last MTM visit: Visit date not found   Next visit within 3 mo: Visit date not found  Next physical within 3 mo: Visit date not found  Prescriber OR PCP: Hamzah Zhao MD  Last diagnosis associated with med order: 1. DM (diabetes mellitus) (H)  - gabapentin (NEURONTIN) 100 MG capsule [Pharmacy Med Name: GABAPENTIN 100MG CAPSULES]; TAKE 1 CAPSULE BY MOUTH TWICE DAILY  Dispense: 60 capsule; Refill: 0    2. HTN (hypertension)  - metoprolol tartrate (LOPRESSOR) 50 MG tablet [Pharmacy Med Name: METOPROLOL TARTRATE 50MG TABLETS]; TAKE ONE TABLET BY MOUTH ONCE DAILY AT 8 AM  Dispense: 90 tablet; Refill: 0    If protocol passes may refill for 12 months if within 3 months of last provider visit (or a total of 15 months).            Passed - Blood pressure filed in past 12 months    BP Readings from Last 1 Encounters:   10/29/18 116/62

## 2021-05-27 NOTE — TELEPHONE ENCOUNTER
Refill Approved    Rx renewed per Medication Renewal Policy. Medication was last renewed on 7/22/18.    Chasity Pham, Care [facility] Triage/Med Refill 4/5/2019     Requested Prescriptions   Pending Prescriptions Disp Refills     amLODIPine (NORVASC) 10 MG tablet [Pharmacy Med Name: AMLODIPINE BESYLATE 10MG TABLETS] 30 tablet 0     Sig: TAKE 1 TABLET(10 MG) BY MOUTH DAILY    Calcium-Channel Blockers Protocol Passed - 4/4/2019 11:17 AM       Passed - PCP or prescribing provider visit in past 12 months or next 3 months    Last office visit with prescriber/PCP: 10/29/2018 Hamzah Zhao MD OR same dept: 10/29/2018 Hamzah Zhao MD OR same specialty: 10/29/2018 Hamzah Zhao MD  Last physical: Visit date not found Last MTM visit: Visit date not found   Next visit within 3 mo: Visit date not found  Next physical within 3 mo: Visit date not found  Prescriber OR PCP: Hamzah Zhao MD  Last diagnosis associated with med order: 1. HTN (hypertension)  - amLODIPine (NORVASC) 10 MG tablet [Pharmacy Med Name: AMLODIPINE BESYLATE 10MG TABLETS]; TAKE 1 TABLET(10 MG) BY MOUTH DAILY  Dispense: 30 tablet; Refill: 0    If protocol passes may refill for 12 months if within 3 months of last provider visit (or a total of 15 months).            Passed - Blood pressure filed in past 12 months    BP Readings from Last 1 Encounters:   10/29/18 116/62

## 2021-05-28 NOTE — TELEPHONE ENCOUNTER
RN cannot approve Refill Request    RN can NOT refill this medication med is not covered by policy/route to provider.       Chasity Pham, Care Connection Triage/Med Refill 4/29/2019    Requested Prescriptions   Pending Prescriptions Disp Refills     XARELTO 20 mg Tab [Pharmacy Med Name: XARELTO 20MG TABLETS] 90 tablet 0     Sig: TAKE 1 TABLET(20 MG) BY MOUTH DAILY WITH SUPPER       Apixaban/Rivaroxaban/Dabigatran Refill Protocol Failed - 4/29/2019  1:32 PM        Failed - Route to appropriate pool/provider     Last Anticoagulation Summary:           Passed - Renal function test in last year     Creatinine   Date Value Ref Range Status   10/29/2018 1.57 (H) 0.60 - 1.10 mg/dL Final             Passed - PCP or prescribing provider visit in past 12 months       Last office visit with prescriber/PCP: 10/29/2018 Hamzah Zhao MD OR same dept: 10/29/2018 Hamzah Zhao MD OR same specialty: 10/29/2018 Hamzah Zhao MD  Last physical: Visit date not found Last MTM visit: Visit date not found   Next visit within 3 mo: Visit date not found  Next physical within 3 mo: Visit date not found  Prescriber OR PCP: Hamzah Zhao MD  Last diagnosis associated with med order: There are no diagnoses linked to this encounter.  If protocol passes may refill for 12 months if within 3 months of last provider visit (or a total of 15 months).

## 2021-05-31 NOTE — TELEPHONE ENCOUNTER
Who is calling:  Patient and her son Alon  Reason for Call:  Patient is asking to discharge from [facility]. Patient is upset that apparently the TCU knew for 4 months she was to be evicted. Patient feels that she is being given the run around, that TCU is poorly run, etc.    Tried to explain without to much detail Dr. Zhao's note, that his is concerned that if patient goes home she will maria elena up in the hospital again. Patient denies this, of course, that she is much stronger. Patient's son states she has worked hard with PT to pivot transfer using her walker and wheelchair. Patient stated, she now has Depends so she doesn't have as many emergency trips to the bathroom.    Patient's son Alon explained they receive a section 42. Patient also receives a CADI Waiver. Alon has already found a new apartment but without the CADI Waiver they are unable to hire a  or put services in place at her new apartment. Alon is afraid they will loose this new place if they don't act quickly.    Encouraged patient to not leave AMA as it can have insurance implication. Patient wanted to come in Friday. Explained Dr. Zhao is not in clinic. Transferred to scheduling to set-up.  Date of last appointment with primary care: 8/14/19  Okay to leave a detailed message: Yes, [phone]

## 2021-05-31 NOTE — PROGRESS NOTES
Clinic Care Coordination Contact  Care Team Conversations     [facility] consulted with RN Supervisor regarding patient. Patient is currently in a TCU and is wanting to leave. It has been noted patient should stay in TCU. She is concerned about losing her housing and her son being homeless. [facility] JAX contacted TCU SW and left voicemail to help coordinate care.

## 2021-05-31 NOTE — PROGRESS NOTES
[facility] Clinic Follow Up Note    Cele Simon   68 y.o. female    Date of Visit: 8/14/2019    Chief Complaint   Patient presents with     Follow-up     Subjective  This is a 68-year-old lady whom I have only seen 2 times in the past.  I have not seen her since last October.  This is a very complicated situation and that she took a fall in the spring and has been in and out of hospitals in transitional care units since April.  I have been able to review some of the information but not all of it.  She talks about exacerbations of her COPD as well as some congestive heart failure.  I am not sure if her diabetes is under good control.  She has very limited mobility.  She is currently in a TCU.  We received a call from the patient stating she was planning to leave the TCU and move home.  She tells me this had something to do with income and a new apartment and her son functioning as a PCA.  When our staff check with the TCU they apparently did not know anything about this plan and seemed concerned that she was leaving TCU.  She is certainly going to need home O2 plus other extensive services if she is going to be able to function at home.  She came in to see if these things could be arranged.  At this point other than the severe weakness and trouble moving she has no worsening shortness of breath.  She denies any chest pain.  She reports to me that she cannot stand for any length of time.    ROS A comprehensive review of systems was performed and was otherwise negative    Medications, allergies, and problem list were reviewed and updated    Exam  General Appearance:   On examination her blood pressure is 110/66.  O2 saturation without oxygen as her O2 supply seem to have been dissipated was 82%.  We placed her on oxygen here at 4 L and that did bring her O2 saturation up over 90%.    Lungs are difficult to examine but I hear no rales or rhonchi.  Breath sounds are quiet.    Heart rhythm seems to  be regular with some ectopic beats.    I am unable to examine her abdomen.    No significant peripheral edema at this time.    The patient is alert and oriented x3.      Assessment/Plan  1. Essential hypertension     2. Type 2 diabetes mellitus without complication, with long-term current use of insulin (H)     3. Chronic obstructive pulmonary disease, unspecified COPD type (H)       Hypertension.  Stable at this time.    Type 2 diabetes.  I am not sure of the current control.    COPD.  O2 saturations are okay with oxygen but she desaturates very quickly.    Significant weakness.  She has been doing therapy at the TCU but I am not sure where she is in the process.    I discussed all of this with the patient at great length.  I explained to her that I have some severe concerns about her ability to function at home.  In addition to her son as a PCA she is clearly going to need some additional therapy as well as home health aides and home oxygen.  I explained that normally these things are set up by the discharging physician at TCU.  However, if she elects to sign out against advice they may be less enthusiastic about doing these things and that there is always the possibility that if she signs out against advice insurance may not cover the things that are needed.  I told her that I would write an order to have the TCU nurses and/or  call me this week so that I could discuss with them what the actual situation is and what the plan going forward should be.    Once all of this is been accomplished and the plan is set up we will follow-up with patient.  Body Mass Index was not assessed due to orthopedic equipment.    Hamzah Zhao MD      Current Outpatient Medications on File Prior to Visit   Medication Sig     acetaminophen (TYLENOL) 325 MG tablet Take 650 mg by mouth.     albuterol (PROVENTIL) 2.5 mg /3 mL (0.083 %) nebulizer solution Inhale 2.5 mg.     amLODIPine (NORVASC) 5 MG tablet Take 5 mg by  mouth.     atorvastatin (LIPITOR) 40 MG tablet TAKE 1 TABLET BY MOUTH EVERY NIGHT AT BEDTIME     gabapentin (NEURONTIN) 400 MG capsule Take 400 mg by mouth.     ipratropium-albuterol (DUO-NEB) 0.5-2.5 mg/3 mL nebulizer Inhale.     magnesium oxide (MAG-OX) 400 mg (241.3 mg magnesium) tablet Take 400 mg by mouth.     metoprolol succinate (TOPROL-XL) 50 MG 24 hr tablet Take 50 mg by mouth.     polyethylene glycol (MIRALAX) 17 gram packet Take 1 packet by mouth.     senna (SENOKOT) 8.6 mg tablet Take 8.6 mg by mouth.     bumetanide (BUMEX) 2 MG tablet Take 0.5 tablets (1 mg total) by mouth daily.     cholecalciferol, vitamin D3, 5,000 unit Tab Take 50,000 Units by mouth.     DESITIN 40 % Pste paste APPLY ONCE D     diphenoxylate-atropine (LOMOTIL) 2.5-0.025 mg per tablet Take 1 tablet by mouth 4 (four) times a day as needed for diarrhea.     ergocalciferol (ERGOCALCIFEROL) 50,000 unit capsule Take 50,000 Units by mouth.     glipiZIDE (GLUCOTROL) 5 MG tablet TAKE 1 TABLET BY MOUTH TWICE DAILY BEFORE MEALS.     insulin glargine (LANTUS; BASAGLAR) 100 unit/mL (3 mL) pen Inject 20 Units under the skin.     insulin lispro 100 unit/mL inph Inject 6 Units under the skin.     lisinopril (PRINIVIL,ZESTRIL) 2.5 MG tablet TAKE 1 TABLET(2.5 MG) BY MOUTH TWICE DAILY     melatonin 3 mg Tab tablet Take 3 mg by mouth.     metoprolol tartrate (LOPRESSOR) 50 MG tablet TAKE ONE TABLET BY MOUTH ONCE DAILY AT 8 AM     MICROLET LANCET USE AS DIRECTED     nystatin (MYCOSTATIN) powder Apply to affected area 2 times daily     nystatin-triamcinolone (MYCOLOG) ointment Apply to affected area twice daily     omeprazole (PRILOSEC) 20 MG capsule Take 1 capsule (20 mg total) by mouth daily before breakfast.     oxybutynin (DITROPAN XL) 10 MG ER tablet TAKE 1 TABLET(10 MG) BY MOUTH DAILY     potassium chloride (K-DUR,KLOR-CON) 20 MEQ tablet Take 20 mEq by mouth.     salicyclic acid-sulfur (SEBULEX) 2-2 % shampoo Apply every 3 days as needed.      senna-docusate (PERICOLACE) 8.6-50 mg tablet Take 1 tablet by mouth daily.     triamcinolone (KENALOG) 0.1 % ointment Apply topically daily.     XARELTO 20 mg Tab TAKE 1 TABLET(20 MG) BY MOUTH DAILY WITH SUPPER     [DISCONTINUED] amLODIPine (NORVASC) 10 MG tablet TAKE 1 TABLET(10 MG) BY MOUTH DAILY     [DISCONTINUED] amLODIPine (NORVASC) 10 MG tablet TAKE 1 TABLET(10 MG) BY MOUTH DAILY     [DISCONTINUED] gabapentin (NEURONTIN) 100 MG capsule TAKE 1 CAPSULE BY MOUTH TWICE DAILY     [DISCONTINUED] potassium chloride (MICRO-K) 10 mEq CR capsule Take 1 capsule (10 mEq total) by mouth 2 (two) times a day.     No current facility-administered medications on file prior to visit.      Allergies   Allergen Reactions     Chocolate      Codeine      Dairy      Nylon      Wool      Social History     Tobacco Use     Smoking status: Former Smoker     Last attempt to quit:      Years since quittin.6     Smokeless tobacco: Never Used   Substance Use Topics     Alcohol use: Not on file     Drug use: Not on file

## 2021-05-31 NOTE — TELEPHONE ENCOUNTER
Provider Communication  Who is calling:  [facility]   Facility in which provider is associated:  [facility]   Reason for call:  Patient would like to discharge this Thursday. Provider has information for patient to discharge. However, they do also still have concerns about patient discharging. If Dr. Zhao could please give Gabriela a returned phone call as soon as able in the morning tomorrow if possible.   Urgency for return call:  As soon as tomorrow.   Okay to leave detailed message?:  Yes

## 2021-05-31 NOTE — PROGRESS NOTES
This is a 68-year-old woman who comes in today because she needs documentation of oxygen needs.  Her oxygen saturations are 82% on room air.  Formal documentation was done at recent visit by Dr. Hamzah Zhao and I refer to his note.  She also wants to leave her TCU AGAINST MEDICAL ADVICE.  This is somewhat complex and our social service healthcare home team has been working with her.  This was a complicated visit trying to understand her current situation and over 25 minutes was spent with the patient and in clinic care team of which current 50% was spent in counseling and coordination of care.  She should follow-up with her primary physician Dr. Hamzah Zhao within 2 weeks.    1. Chronic respiratory failure with hypoxia (H)     2. Type 2 diabetes mellitus without complication, with long-term current use of insulin (H)     3. Chronic kidney disease, stage III (moderate) (H)     4. Essential hypertension     5. Morbid obesity (H)       Hamzah Romeo

## 2021-05-31 NOTE — PROGRESS NOTES
CHW met with patient briefly at the request of the PCP. PCP was informed patient has been enrolled in [facility] twice before and is non-compliant.    Patient is wanting to leave TCU against medical advise. PCP does not support this.  Patient is wanting to leave because she lives with her son and they are losing their apartment at the end of the month and they won't have housing until mid Sept. Patient's son is her PCA and isn't able to clock hours while she is in the TCU and they rely on that income to pay the rent.   CHW tried to tell patient that the [facility] SW is only in clinic Wed and Thursday and that the TCU SW is who should be helping her with her discharge needs.   Patient became verbally abusive and CHW enlisted the help of the Clinic RN Supervisor, during that time she became verbally abusive with other staff. Security was called and patient was escorted to the lobby to wait for [employer].

## 2021-05-31 NOTE — TELEPHONE ENCOUNTER
Dr. Zhao,  Spoke with the patient and per Medicare guidelines, the patient needs to be seen in order to place orders for oxygen, supplies, nebulizer, supplies, and nebulizer solution.  Patient is scheduled for 40 minutes tomorrow afternoon.    Spoke with Vanessa, the patient's RN at the TCU, who states that this is the first she is hearing of the patient wanting to leave the TCU.  Vanessa states that the patient told her that she is doing well with her physical therapy and she is prepared to go home.  Patient told Vanessa that her son will be her care giver.  Since I spoke with Vanessa at the end of the shift, she is going to turn this information over to the oncoming nurse and have her check with the provider to get his impression on if the patient is ready to be discharged.  TCU states that they will call back with recommendations from their provider.  Balbina SOLIMAN CMA/MARY....................3:08 PM

## 2021-05-31 NOTE — TELEPHONE ENCOUNTER
Who is calling:  Jyoti, Nurse  with [facility]  Reason for Call:  The patient was admitted to [facility] on 8/24/2019 and Jyoti would like to speak with the social work staff that recently met with the patient.  Date of last appointment with primary care: 8/23/2019  Okay to leave a detailed message: Yes

## 2021-05-31 NOTE — TELEPHONE ENCOUNTER
Orders being requested:   - Oxygen and supplies  - Nebulizer, supplies, and solutions  Reason service is needed/diagnosis: Breathing  When are orders needed by: Tomorrow.  Where to send Orders: Patient reported this should be sent to [facility].  Patient does not have the contact information.  Okay to leave detailed message?  No    [phone]  Ask for room 184    Patient states these order are needed as soon as possible due to the patient moving on Thursday to go back home.  Patient is currently at the [facility].        FYI - Status Update  Who is Calling: Patient  Update: Call and stated she is currently staying at the [facility].  Patient reported she is leaving to move back home against the advice of the facility. Patient reported there is no reason she cannot go home as long as she has everything she needs to help her breathing.  Okay to leave a detailed message?:  No

## 2021-05-31 NOTE — TELEPHONE ENCOUNTER
This is a terribly complex problem.  I spoke with the TCU yesterday.  They do not feel she is ready to leave the TCU.  She is not standing or walking on her own.  They have great concerns and other physician is refusing to discharge her which means she would need to sign out AGAINST MEDICAL ADVICE.  I did see her last week and although I am not comfortable in discharging her we could put these orders in for her but it certainly is not going to happen today.  My personal feeling is that it would be best if someone could help her son with a place to live until she is ready to be discharged from TCU.  I worry that if she is discharged to her apartment that she will be right back in the hospital because of an inability to care for herself.  Even with some home health assistance I am not sure that anyone is able to be with her 24 hours/day.  I am a little unclear as to how to proceed at this point.  Since I agree with the TCU that she is probably not ready for discharge I am not sure what my liability is if I put in orders for these things at home.  You may need to talk to a higher authority.

## 2021-05-31 NOTE — TELEPHONE ENCOUNTER
Who is calling:  Patient      Reason for Call:  Calling to update ORDERS REQUEST FOR OXYGEN AND NEBULIZER SUPPLIES . Please send to :   [facility]  [address] [place] [address]  Phone: [phone]  Fax: [phone]    Date of last appointment with primary care:   08/14/19    Okay to leave a detailed message: Yes    Please expedite this request, Supplies will be shipped to Patients Home address. Patient is scheduled to leave TCU this Thursday 08/22/19 and will need supplies in place.  Please advise

## 2021-05-31 NOTE — TELEPHONE ENCOUNTER
Left Mee a detailed message that Dr. Romeo didn't order any oxygen on Cele.  Dr. Romeo didn't place any orders on her.

## 2021-05-31 NOTE — TELEPHONE ENCOUNTER
I did speak with her.  We discussed the patient's situation and I agree with a assessment at the home that she is not ready for discharge and is unsafe in her home situation.

## 2021-05-31 NOTE — TELEPHONE ENCOUNTER
"Pt states \"I'm being forced out of my apartment on [address].\"  Not being kicked out of TCU which prior chart notes appear to indicate.  Pt states \"Apartment owners are kicking us out.\"  (Son lives with pt.)  Pt therefore intends to leave TCU so that she and her son can find a new apartment together.  Pt states \"Otherwise my son is homeless too.\"    Pt states \"I need a home health aide, , home oxygen, and nebulizer machine.\"    Pt states \"This is an emergency.\"  \"I have to leave the TCU today (Aug 22nd) to find a new place for me and my son.\"  \"These respiratory things need to be in place when I leave the TCU.\"  \"I have to go where my son goes.\"  Both the pt and her son will remain at current [facility] until August 31st.    \"Right now, I just want orders for these things to be delivered to current apartment.\"  Pt will then call back with a new address when ready for actual home care.    Current address (until end of August):  [address]   [address]  [place] [address]    Advice to pt -> Offered [employer] contact info for her son (to prevent homelessness for him) while she could remain at [facility] for full length of care ...  Pt states \"Well what do we do with all our stuff then?\"  Uncertain whether pt will consider staying at [facility] ...    Please advise re orders for:   - home health aide  -    -  home oxygen  - nebulizer machine.    June Oneill RN BSBA  Care Connection RN Triage     Reason for Disposition    [1] Follow-up call from patient regarding patient's clinical status AND [2] information urgent    Protocols used: PCP CALL - NO TRIAGE-A-AH      "

## 2021-05-31 NOTE — TELEPHONE ENCOUNTER
Mee Cele Medic care coordination called to check in that she has a physion on the comity that she see and is able to sign orders for her.  She will be leaving the TCU this weekend and has a list of things she will need.  Mee would like to know what happens at her appointment today.  We can either call her back or fax today office visit to her.    Call back and fax number are the same at [phone]    Dena Fox CMA ([facility])

## 2021-06-01 VITALS — BODY MASS INDEX: 53.92 KG/M2 | HEIGHT: 62 IN | WEIGHT: 293 LBS

## 2021-06-02 VITALS — BODY MASS INDEX: 53.92 KG/M2 | HEIGHT: 62 IN | WEIGHT: 293 LBS

## 2021-06-03 VITALS — BODY MASS INDEX: 53.92 KG/M2 | WEIGHT: 293 LBS | HEIGHT: 62 IN

## 2021-06-14 NOTE — PROGRESS NOTES
DATE OF SERVICE: 12/14/2017    SUBJECTIVE:  Interesting 67-year-old female presents today for a recheck.  She has  been out of the nursing home for approximately 3 weeks and has now found her  glucometer and is checking her blood sugars every morning and occasionally in the  evening.  They have been running consistently in the 120 to 140 range with  occasional excursion to the 160-170 range.  ROS negative for hypoglycemia.  Negative  for syncope, negative for chest pain or shortness of breath.    She also has sleep apnea and is continuing on her CPAP machine.  She has been  referred for sleep testing to reevaluate her CPAP.    She has morbid obesity and that remains unchanged.  Her hypertension remains stable.   She continues on lisinopril as well as currently on apixaban for a history of  pulmonary embolus.    OBJECTIVE:  VITAL SIGNS:  Blood pressure 120/92, pulse 92, respirations 20.  HEENT:  Grossly normal.  LUNGS:  Clear to auscultation.  HEART:  Regular rate and rhythm.  Normal S1, normal, S2.  ABDOMEN:  Obese.  EXTREMITIES:  Examination of both legs shows no edema.  SKIN:  Pink and dry.  PSYCHIATRIC:  She is alert, conversant and very pleasant.    ASSESSMENT:  1.  Diabetes.  2.  Pulmonary embolus.  3.  Morbid obesity.  4.  Kidney disease.  5.  Hypertension.  6.  Sleep apnea.    PLAN:  1.  The patient has been referred to sleep study.  2.  Continue lisinopril for hypertension.  3.  Continue glipizide.  4.  Check blood sugars a.m. fasting and a glucometer of her choice was ordered at  [facility] with test strips for 100 days with 3 refills.  5.  Continue metoprolol.  6.  Continue amlodipine.  7.  Continue Lipitor for hyperlipidemia.  8.  Continue with Bumex.  9.  Will follow up in 4 weeks.  10.  Gave her encouragement for continuing to check her blood sugars.  11.  Nurse support and social work brought into the room for evaluation and further  social support.      MD sindy OGDEN 12/14/2017  13:52:31  T 12/14/2017 15:08:36  R 12/14/2017 15:08:36  [id number]        cc:YAMILETH MENDOZA MD

## 2021-06-14 NOTE — PROGRESS NOTES
Pt new to [facility], establishing care. She came to the clinic with paperwork from [facility] stating she needs an order to start home care and paper work needing to be faxed to Social Security to resume SSI. Call made to [employer] and verified they have an order for SOC; they needed to verify that pt has established care at this clinic. [facility] will be seeing pt tomorrow to start care and will fax the POC to clinic for PCP signature. Pt doesn't know the name of her county . She has the name of her [facility]  but didn't have the phone number; this writer wasn't able to assist with coordination of other services without those phone numbers. Wrote a reminder note for pt to contact her [facility]ica  to get help with coordination of other services (homemaking, PCA, MOW) and DME and for help with SSI issue. Pt verbalized understanding. This writer made a referral for [facility].

## 2021-06-14 NOTE — PROGRESS NOTES
RN Recommendations and Referrals  CDE Referral: patient would benefit from visit with CDE for reeducation on self-management of diabetes  PT/OT Evaluation: patient would like home therapy for conditioning and to improve her activity tolerance. This was not ordered at discharge from TCU, but would be beneficial   See below for action plan    Action Plan    RN Will  Refer to CDE  Will add the patient to [facility] RN tracking list  Be available to the patient as nursing needs arise    Care Guide Will  Within 2 weeks from the RN assessment visit, by 1/4/17   Help the patient coordinate goal setting visit if not already coordinated  At goal setting visit   Review goals set at PCAM visit and create or add to action plan.    Please discuss CDE and please ensure patient gets schedule with CDE   Please discuss home PT/OT referral (pt uses [facility]   Please get ELROY to coordinate care with patient's [facility] care coordinator (see letter from her Care Coordinator in media section of Epic)  After goal setting visit   Please ensure patient gets schedule with CDE   Please follow up on home PT/OT referral              Please check into getting patient's sleep study records [facility] (pt refusing to have another sleep study as she states she [facility] ([facility] to [facility]'s  sleep center).    Please coordinate with [facility] Care Coordinator to find out status of her trapeze for hospital bed (she's been waiting for three weeks since her d/c from TCU)     Goals        Patient Stated      I need some help getting my care coordinated (records and such) (pt-stated)            Action steps to achieve this goal  1.  I will ask my CG to help get my sleep study records from [facility] transferred to [facility]  2.  I will ask my CG for help in following finding out the status of my trapeze for my hospital bed   3.  I will provide my CG with my other ' contact information so she can coordinate with them  if needed    Date goal set:  12/20/17        I want to get my strength back and be able to walk around my apartment (pt-stated)            Action steps to achieve this goal  1.  I will ask for help from the [facility] team to ask my doctor for a home health referral for PT/OT  2.  If therapy is ordered, I will work with the therapist as often as he/she feels necessary  3.  I will perform home exercises prescribed by my therapist on my own or with help from my son if needed    Date goal set:  12/20/17        I want to work on taking better care of my diabetes (pt-stated)            Action steps to achieve this goal  1.  I will meet with the Diabetic Nurse to learn more about managing my diabetes on my own  2.  I will be diligent about checking my blood sugar at least twice a day and recording them in a log  3.  I will try to add more exercise to my routine when I get stronger  4.  I will watch my carbohydrates and sweets and follow the portions I learn from the Diabetic Nurse  5.  I will continue to take my diabetic medications as ordered by my doctor.     Date goal set:  12/20/17              Clinic Care Coordination RN Assessed Needs  Patient Centered Assessment Method-PCAM TOTAL SCORE: 30 (12/21/2017  9:25 AM)  Level 2:  A score of 25-36 indicates that the patient has a moderate initial need for RN or SW intervention at the discretion of the .  The RN will add this patient to her panel and follow closely in partnership with the care guide until stable.  She will reach out to the care guide for support in care coordination needs and graduate the patient to standard care guide outreach when appropriate.        PCAM (Patient Centered Assessment Method) HEALTH AND WELL-BEING  Physical Health Concerns: Diabetes (recent pulmonary embolus; fatigue; obesity; urinary incontinence; memory issues. )  Other Physical Health Concerns:: decreased mobility related obesity and generalized deconditioning from recent  "hospitalizations and TCU stays.   RN Assessment: Physical Health Needs: Mod to severe symptoms or problems that impact on daily life  RN Assessment: Physical Health Problems: Mild impact on mental well-being e.g. \"feeling fed-up\", \"reduced enjoyment\"  Mental Health Concerns: Denies concerns that require further investigation (denies significant issues, but does complain of \"low energy\" and boredom)  RN Assessment:Other Mental Well-Being Concern: Mild problems- don't interfere with function  Lifestyle/Habit Concerns: Exercise/Activity (receives one meal/day through [employer]; other meals are convenience meals prepared by  her son. )  Other Lifestyle/Habit Concerns:: patient is very sedentary and is unable to walk like she used to.  She is basically chair bound during the day.  Would like to have home therapy to get back to her baseline.   RN Assessment: Lifestyle Behaviors: Mod to severe impact on client's well-being, preventing enjoyment of usual activities  SOCIAL ENVIRONMENT  Home Environment Concerns: Denies concerns that require further investigation (lives in a 2 bedroom/2 bath apt with her son. No stairs. )  RN Assessment: Home Environment: Safe, stable, but with some inconsistency  Daily Activities Concerns:  (pt requires assist of one with bathing (has [facility]HA for this). She tries hard to remain independent with her other ADLs. Her son is her PCA (4hrs/day) and helps with IADLs. )  RN Assessment: Daily Activites: Restricted participation with some degree of social isolation  Social Network Concerns: Socially isolated (her son lives with her and is very supportive. She has two daughters, but they are estranged. She states they only have each other and do not have friends.  )  RN Assessment: Social Network: Restricted participation with some degree of social isolation  Financial Status and Service Concerns: Unemployed (retired. Pt gets $515 in SS, her son gets about $900. He also has income from his PCA work " with her. THey both receive SNAP benefits. She states they do not receive subsidized housing and rent is about $900.  )  Other Financial Status and Service Concerns:: denies trouble covering medical bills or medications   RN Assessment: Financial Resources: Financially secure, some resource challenges  HEALTH LITERACY AND COMMUNICATION  Understanding of Health and Wellbeing Concerns: Reasonable to good understanding but does not feel able to engage with advise at this time  Other Undertanding of Health and Wellbeing Concerns:: minimizes severity of conditions and their long-term impact.   RN Assessment: Health Literacy: Reasonable to good understanding but do not feel able to engage with advice at this time (patient is not good about checking her blood sugars, but does know self-management is important to her overall health. )  Engagement Concerns: Adequate communication, with or without minor barriers (some memory issues (per patient); able to answer questions appropriately. )  RN Assessment: Engagement: Adequate communication, with or without minor barriers  Barriers to Compliance with Medical Recommendations: Transportation, Financial (difficult to get into the clinic due to her immobility (w/c bound) and lack of transporation (uses [employer]))  Other Barriers to Compliance with Medical Recommendations Concerns:: patient states she is good about taking her medications which are set up by a home health health nurse.  Not good about checking sugars (needs new glucometer, but has not gone to pharmacy to pick it up).    SERVICE COORDINATION  Other Services: Other care/services in place with some coordination barriers (has home health (RN, HHA); needs PT/OT at home. Does have a [facility]  and a county worker for her waivered services. )  Coordination of Services: Required care/services in place with some coordination barriers (some issues getting DME in the home (needs Trapeze for bed). Would also like  PT/OT. )  PCAM TOTAL SCORE: 30      Emergency Plan  Diabetes: Sick-Day Plan  Infections, the flu, and even a cold, can cause your blood sugar to rise. And, eating less, nausea, and vomiting may cause your blood glucose to fall (hypoglycemia). Ask your health care provider to help you develop a sick-day plan. The following information can help.    Call your health care provider if:    You vomit or have diarrhea for more than 6 hours.    Your blood glucose level is higher than usual or over 250 mg/dL after you have taken extra insulin (if recommended in your sick-day plan).    You take oral medicine for diabetes, and your blood sugar is higher than usual or over 250 mg/dL, before a meal and stays that high for more than 24 hours.    Your blood glucose is lower than usual or less than 70 mg/dL    You have moderate to large amounts of ketones in your blood or urine.    You aren t better after 2 days.    Preventing Falls    Having a health problem can make you more likely to fall. Taking certain kinds of medicines may also increase your risk of falls. So, improving your health can help you avoid a fall. Work with your healthcare provider to manage health problems and to review your medicines. If you have your health under control, your risk of falling is lessened.    When to call your healthcare provider  Be sure to call your healthcare provider if you fall. Also call if you have any of these signs or symptoms (someone else may need to point them out to you):    Feeling lightheaded or dizzy more than once a day    Losing your balance often or feeling unsteady on your feet    Feeling numbness in your legs or feet, or noticing a change in the way you walk    Having a steady decline in your memory or mental sharpness  High Blood Pressure (Hypertension)  You have been diagnosed with high blood pressure (also called hypertension). This means the force of blood against your artery walls is too strong. It also means your heart  is working hard to move blood. High blood pressure usually has no symptoms, but over time, it can damage your heart, blood vessels, eyes, kidneys, and other organs. With help from your doctor, you can manage your blood pressure and protect your health.  Taking medications    Learn to take your own blood pressure. Keep a record of your results. Ask your doctor which readings mean that you need medical attention.    Take your blood pressure medication exactly as directed. Don t skip doses. Missing doses can cause your blood pressure to get out of control.    Avoid medications that contain heart stimulants, including over-the-counter drugs. Check for warnings about high blood pressure on the label.    Check with your doctor before taking a decongestant. Some decongestants can worsen high blood pressure.  Lifestyle changes    Maintain a healthy weight. Get help to lose any extra pounds.    Cut back on salt.  o Limit canned, dried, packaged, and fast foods.  o Don t add salt to your food at the table.  o Season foods with herbs instead of salt when you cook.    Follow the DASH (Dietary Approaches to Stop Hypertension) eating plan. This plan recommends vegetables, fruits, whole gains, and other heart healthy foods.    Begin an exercise program. Ask your doctor how to get started. The American Heart Association recommends aerobic exercise 3 to 4 times a week for an average of 40 minutes at a time, with your doctor's approval. Simple activities like walking or gardening can help.    Break the smoking habit. Enroll in a stop-smoking program to improve your chances of success. Ask your health care provider about programs and medications to help you stop smoking.    Limit drinks that contain caffeine (coffee, black or green tea, cola) to 2 per day.    Never take stimulants such as amphetamines or cocaine; these drugs can be deadly for someone with high blood pressure.    Control your stress. Learn stress-management  techniques.    Limit alcohol to no more than 1 drink a day for women and 2 drinks a day for men.  Follow-up care  Make a follow-up appointment as directed by our staff.     When to seek medical care  Call your doctor immediately if you have any of the following:    Chest pain or shortness of breath (call 911)    Moderate to severe headache    Weakness in the muscles of your face, arms, or legs    Trouble speaking    Extreme drowsiness    Confusion    Fainting or dizziness    Pulsating or rushing sound in your ears    Unexplained nosebleed    Weakness, tingling, or numbness of your face, arms, or legs    Change in vision    Blood pressure measured at home that is greater than 180/110

## 2021-06-14 NOTE — PROGRESS NOTES
Chief Complaint   Patient presents with     Follow-up     new patient, needs transfer bars over bed     Medication Management     wants to decrease water pill       HPI: Ms. chen presents today after being discharged from a nursing home after being in there for multiple months.  She is a very poor historian, does not answer questions appropriately, and appears very disinterested.  She tells me that she was at [facility] for quite some time with a blood clot but can provide no other information.  She shows me had written prescriptions for trapeze bed written by Dr. Burns as well as another medication prescription.  She states that she is diabetic, and that she has a blood sugar kit at home but is not want to check her blood sugars.  Her main goal today is to have a  ordered by me to clean her home.  There is also a letter from a  stating that she would benefit from a visiting nurse and home health aide to help her bathe.  Her son brought her into the appointment today but left and walked across the street to the local grocery store so was unable for visiting.    ROS: Patient poor historian and unable to glean much information.  No hypoglycemic episodes reported.  Systems review shows history of incontinence, loss of memory, chronic worrying, shortness of breath with exercise, loss of smell and numerous other medical issues.    SH: Single,  with children and former smoker  FH: Positive for hypertension heart disease stroke and diabetes asthma depression.     Meds:  Cele has a current medication list which includes the following prescription(s): amlodipine, atorvastatin, bumetanide, gabapentin, glipizide, lisinopril, metoprolol tartrate, omeprazole, potassium chloride, rivaroxaban, and senna-docusate.    O:  /82  Pulse 89  Resp 20  SpO2 98%  Heart irregular irregular consistent with A. fib but good rate control  Lungs clear to auscultation  No acute  "distress  Morbidly obese and unable to ambulate    A/P:   1. DM (diabetes mellitus)  - Ambulatory referral to Home Health  -I encourage patient to check her blood sugars but she stated \"I will find my kit\".    2. Morbid obesity  -Stable    3. Hypertension  -Continue current medication    4. Chronic GERD  -Continue omeprazole    5. A-fib  -Continue rivaroxaban and metoprolol    Patient should return within 2 weeks for further evaluation.  I discussed in detail with our social work staff here in the clinic and they are assisting her to obtain further services.  Patient currently has a lot of needs in my opinion I told the patient she most likely would need to be back in a nursing home which is not what she wanted to hear.  Given her morbid obesity, her difficulty ambulating and her chronic medical conditions care at home is doubtful.      Over 45 minutes spent in direct patient care and coordinating care with social work.                                    "

## 2021-06-15 NOTE — PROGRESS NOTES
Chief Complaint   Patient presents with     Follow-up     teeth disinagrating, toenails white, referral to podiatry, requests labs       HPI: Dayana comes in today for multiple issues.  Her diabetes remained stable and I looked at her blood sugars and they are all running in the 140-150 range relatively consistency.  Minimal variation.  She denies any diabetic neuropathy.  She has not had a recent eye exam.    She has complaints today about her toenails.  They are unable to be trimmed by her due to her weight.    She continues on the rivaroxaban for her pulmonary embolus and she has no shortness of breath other than what is normal given her signs.    Her hypertension remained stable on Bumex, Norvasc and metoprolol and lisinopril.    ROS: No fever.  No bleeding.     SH: The patient is cared for by her son     FH: The Patient's family history is not on file.    Meds:  Cele has a current medication list which includes the following prescription(s): amlodipine, atorvastatin, bumetanide, gabapentin, glipizide, lisinopril, metoprolol tartrate, omeprazole, potassium chloride, rivaroxaban, and senna-docusate.    O:  /90  Pulse 87  Resp 24  SpO2 92%     Lungs--Clear to Auscultation  Heart--Regular rate and rhythm  Abdomen--Soft, non-tender, non-distended  Skin-Pink and dry   Feet- all toenails are long and jagged with mild onychomycosis noted    A/P:   1. Diabetes mellitus  -Eye exam ordered  - HM2(CBC w/o Differential)  - Glycosylated Hemoglobin A1c  - Lipid Cascade  - Comprehensive Metabolic Panel  - XR Ankle Left 3 or More VWS; Future    2. Left ankle pain  -Discussed case with Dr. Cyr who obtained x-rays showing no evidence of pathology.    3. Hyperlipidemia  -Continue statin    4. HTN (hypertension)  -Continue medication therapy as above    5. Morbid obesity  -No change    6. NICOLE (obstructive sleep apnea)  -Continue sleep    See back in 3 months

## 2021-06-15 NOTE — PROGRESS NOTES
Talked with patient, she needed to reschedule her follow up and [facility] enrollment visit for today due to illness and weather. Rescheduled to 1/18/18

## 2021-06-15 NOTE — PROGRESS NOTES
My Clinic Care Coordination Care Plan    [facility]  [address]  [address], [place]  [address]  [phone]    My Preferred Method of Contact:  Phone: [phone]     My Primary/Preferred Language:  English    Preferred Learning Style:  Reading information, Face to face discussion, Pictures/Diagrams and Hands on teaching    Emergency Contact: Extended Emergency Contact Information  Secondary Emergency Contact: Alon Whitaker   [facility]  Home Phone: [phone]  Mobile Phone: [phone]  Relation: Child     PCP:  Bird Mcmullen MD  Specialists:    Care Team            Bird Mcmullen MD PCP - General, Family Medicine    [phone]     Bird Mcmullen MD Hospitalist, Family Medicine    [phone]     Leonor Reis, [facility], [place] Care Coordination Care Guide, Clinic Care Coordination    [facility] [phone] Fax [phone]          Hospitalizations and/or ED Visits  Most Recent: New to  system was in the TCU x 4 months    Previous:   Reason:      Concerns regarding my health: Diabetes, Obesity     Advanced Directive/Living Will: The patient was given information regarding Adanced Directives/Living Will      Cele elected to enroll in the [facility] Care Coordination.  Cele was given a copy of the Clinic Care Coordination brochure and full description of how to access their care team both during clinic hours and after hours.   My Care Guide's Name and Phone Number:  Leonor [phone]  The Care Guide and myself agreed to be in contact monthly.      Medication Update  The patient's medication list is up to date per Dr. Mcmullen    Health Maintenance and Immunization Update  The patient's preventive health screenings and immunizations are not up to date and the following actions are recommended and will be followed up by my Care Guide Follow PCPs recommendation for test  patient can tolerate.    My Emergency Plan    Emergency Plan  Diabetes: Sick-Day Plan  Infections, the flu, and even a cold, can cause your blood sugar to rise. And, eating less, nausea, and vomiting may cause your blood glucose to fall (hypoglycemia). Ask your health care provider to help you develop a sick-day plan. The following information can help.     Call your health care provider if:    You vomit or have diarrhea for more than 6 hours.    Your blood glucose level is higher than usual or over 250 mg/dL after you have taken extra insulin (if recommended in your sick-day plan).    You take oral medicine for diabetes, and your blood sugar is higher than usual or over 250 mg/dL, before a meal and stays that high for more than 24 hours.    Your blood glucose is lower than usual or less than 70 mg/dL    You have moderate to large amounts of ketones in your blood or urine.    You aren t better after 2 days.     Preventing Falls    Having a health problem can make you more likely to fall. Taking certain kinds of medicines may also increase your risk of falls. So, improving your health can help you avoid a fall. Work with your healthcare provider to manage health problems and to review your medicines. If you have your health under control, your risk of falling is lessened.     When to call your healthcare provider  Be sure to call your healthcare provider if you fall. Also call if you have any of these signs or symptoms (someone else may need to point them out to you):    Feeling lightheaded or dizzy more than once a day    Losing your balance often or feeling unsteady on your feet    Feeling numbness in your legs or feet, or noticing a change in the way you walk    Having a steady decline in your memory or mental sharpness  High Blood Pressure (Hypertension)  You have been diagnosed with high blood pressure (also called hypertension). This means the force of blood against your artery walls is too strong. It also  means your heart is working hard to move blood. High blood pressure usually has no symptoms, but over time, it can damage your heart, blood vessels, eyes, kidneys, and other organs. With help from your doctor, you can manage your blood pressure and protect your health.  Taking medications    Learn to take your own blood pressure. Keep a record of your results. Ask your doctor which readings mean that you need medical attention.    Take your blood pressure medication exactly as directed. Don t skip doses. Missing doses can cause your blood pressure to get out of control.    Avoid medications that contain heart stimulants, including over-the-counter drugs. Check for warnings about high blood pressure on the label.    Check with your doctor before taking a decongestant. Some decongestants can worsen high blood pressure.  Lifestyle changes    Maintain a healthy weight. Get help to lose any extra pounds.    Cut back on salt.    Limit canned, dried, packaged, and fast foods.    Don t add salt to your food at the table.    Season foods with herbs instead of salt when you cook.    Follow the DASH (Dietary Approaches to Stop Hypertension) eating plan. This plan recommends vegetables, fruits, whole gains, and other heart healthy foods.    Begin an exercise program. Ask your doctor how to get started. The American Heart Association recommends aerobic exercise 3 to 4 times a week for an average of 40 minutes at a time, with your doctor's approval. Simple activities like walking or gardening can help.    Break the smoking habit. Enroll in a stop-smoking program to improve your chances of success. Ask your health care provider about programs and medications to help you stop smoking.    Limit drinks that contain caffeine (coffee, black or green tea, cola) to 2 per day.    Never take stimulants such as amphetamines or cocaine; these drugs can be deadly for someone with high blood pressure.    Control your stress. Learn  stress-management techniques.    Limit alcohol to no more than 1 drink a day for women and 2 drinks a day for men.  Follow-up care  Make a follow-up appointment as directed by our staff.     When to seek medical care  Call your doctor immediately if you have any of the following:    Chest pain or shortness of breath (call 911)    Moderate to severe headache    Weakness in the muscles of your face, arms, or legs    Trouble speaking    Extreme drowsiness    Confusion    Fainting or dizziness    Pulsating or rushing sound in your ears    Unexplained nosebleed    Weakness, tingling, or numbness of your face, arms, or legs    Change in vision    Blood pressure measured at home that is greater than 180/110         All [facility] clinic patients have access to a Nurse 24 hours a day, 7 days a week.  If you have questions or want advice from a Nurse, please know [facility] is here for you.  You can call your clinic and they will connect you or you can call Care Connection at [phone].  [facility] also has Walk In Care clinics in multiple locations.  Call the number listed above for more information about our Walk In Care clinics or visit the [facility] website at [email or web address].    Patient Support  I will ask my emergency contact for help in supporting me in these goals.  Relationship to patient: Son   Home # : [phone] , best time to call is afternoons

## 2021-06-15 NOTE — PROGRESS NOTES
ASSESSMENT: Onychauxis, diabetes with neurological manifestations, left ankle pain.    PLAN: Toenails were debrided manually and mechanically x10.  Left ankle x-ray appears to be negative.  Return to clinic in nine weeks.         SUBJECTIVE: The patient presents to the [facility] as a new patient. Toenails are long, thick and painful.  She has had diabetes for many years.  She describes altered sensation in the feet.  Because of body habitus, she cannot reach the feet to take care of the nails.  She recalls falling twice a year ago, and continues to have pain around the lateral left ankle.  No previous x-rays are.  She walks with a walker today.    OBJECTIVE:  General: Pleasant 67 y.o. female in no acute distress.  Vascular: DP pulses are diminished. PT pulses are diminished. Pedal hair is diminished. Feet are warm to the touch.  Cardiac: Pulse is regular.  Lymphatic: No edema at the ankles.  Neuro: Sensation in the feet is altered. Patient describes paresthesias.  Derm: Toenails are elongated, thickened and dystrophic with discoloration and subungual debris. Skin is thin and shiny but intact.   Musculoskeletal: Pain with palpation along the left fibula today.  X-ray views are suboptimal with her body habitus, but no fracture is identified.     Past Medical History:   Diagnosis Date     Diabetes mellitus        She has no past surgical history on file.    Allergies   Allergen Reactions     Chocolate      Codeine      Dairy      Nylon      Wool        Current Outpatient Prescriptions   Medication Sig Dispense Refill     amLODIPine (NORVASC) 10 MG tablet Take 1 tablet (10 mg total) by mouth daily. 30 tablet 4     atorvastatin (LIPITOR) 40 MG tablet Take 1 tablet (40 mg total) by mouth at bedtime. 30 tablet 4     bumetanide (BUMEX) 2 MG tablet Take 0.5 tablets (1 mg total) by mouth daily. 30 tablet 0     gabapentin (NEURONTIN) 100 MG capsule Take 100 mg by mouth 2 (two) times a day. Two tabs two times daily  60 capsule 4     glipiZIDE (GLUCOTROL) 5 MG tablet Take 1 tablet (5 mg total) by mouth 2 (two) times a day before meals. Two tabs twice daily 60 tablet 4     lisinopril (PRINIVIL,ZESTRIL) 2.5 MG tablet Take 1 tablet (2.5 mg total) by mouth 2 (two) times a day. 60 tablet 4     metoprolol tartrate (LOPRESSOR) 50 MG tablet Take 1 tablet (50 mg total) by mouth Daily at 8:00 am.. 30 tablet 4     omeprazole (PRILOSEC) 20 MG capsule Take 1 capsule (20 mg total) by mouth daily before breakfast. 90 capsule 3     potassium chloride (MICRO-K) 10 mEq CR capsule Take 1 capsule (10 mEq total) by mouth 2 (two) times a day. 60 capsule 4     rivaroxaban 20 mg Tab Take 1 tablet (20 mg total) by mouth daily with supper. 90 tablet 0     senna-docusate (PERICOLACE) 8.6-50 mg tablet Take 1 tablet by mouth daily. 30 tablet 0     No current facility-administered medications for this visit.        Family History:  Obesity    Social History:  Reviewed, and she is not a current smoker.    Review of Systems:  A 12 point comprehensive review of systems was negative except as noted.

## 2021-06-16 NOTE — PROGRESS NOTES
Needs assistance with scheduling dental appt- States that she sees [facility] [phone]  Needs to scheduled f/u with Dr Cyr-number given to patient to schedule her follow up around 3/22   Called her [employer] worker regarding her [employer] passes[facility] (Jaz Eugene [phone]  States she has someone now that is coming 1-2 times a week to help with housekeeping, they are currently cleaning out some extra medical supplies that accumulated while she was in the nursing home. Once these are put away they will be able to put up her trapeze for her to get in and out of bed easier.

## 2021-06-16 NOTE — PROGRESS NOTES
Spoke with patient briefly, she was on the other line and states that she will call back later today    Next outreach 4/9/18

## 2021-06-16 NOTE — PROGRESS NOTES
Memory-Patient has had increased memory lately. She states that she forgets how to do things she has done many times before.     Appointments: Cele needs help with scheduling her outside appointments. Her son is her PCA and should be helping her with those. She needs to schedule follow up with Dr Cyr (podiatry) and also with her dentist. She is concerned about the dentist not working on her teeth because she was on coumadin in the past. She is currently on Xarelto. She would like us to fax her medication list. Care guide advised her to schedule the appointment and have the dental office call us to get a current med list. Patient agrees.    Physical therapy: patient states that she is not getting physical therapy because they aren't doing it in the home. Patient is homebound, will send message to PCP regarding new orders for Home Care PT    Next outreach due: 3/15/18

## 2021-06-17 NOTE — PROGRESS NOTES
Pt was being seen in clinic today by PCP and since CG has not been able to reach pt, PCP asked CG to touch badse with pt.  Per PCP, pt has gained 23lbs since the last visit, needs lab work, a diabetic eye exam and a urology appt due to urinary incontinence.  Pt was also requesting an appt with the podiatrist on site, but podiatrist was booked and unable to work pt in.  CG introduced herself as new CG then explained to pt that CG has been trying tpo reach pt for a couple weeks but unable to leave messages.  Pt stated that the voice mail box is full and there are so many messages that it will take her too long to delete them.  Pt will contact the phone company and ask them to delete them for pt.  CG talked about the appts that need to be scheudled, with pt and pt wasn't sure where to go.  CG asked about pt's insurance as computer wasn't available at the moment and pt told CG it was Medicare and [employer].  CG then stated that most often we would refer to [facility] and [facility] and told her that they both have locations in [place].  Pt stated that she would like [place].  CG talked this over with Natalya, specialty , and Natalya will enter orders into each of those clinic's portals, they will then contact pt directly for scheduling.  CG then informed pt that CG is concerned that the appts won't get scheduled due to her voice mail being full.  CG explained that if they can't leave a message and pt doesn't answer the line, she won't even know they called to get it scheduled.  CG then gave pt appt reminder cards, from the specialty , for both [facility] and [facility] with address and phone numbers to those clinics circled.  CG asked that pt wait approx 24 hours before calling to schedule the appt herself.  Pt agreed to call if she hasn't heard from the clinics in a few days.  Specialty  will let CG know if appts have not been scheduled.    Next Outreach: 06/04/2018

## 2021-06-17 NOTE — PROGRESS NOTES
Attempt 1: Care Guide called patient.  If this patient is returning my call, please transfer to Karena at ext [phone].    **Was unable to leave message, voice mail box full**    Next Outreach: 05/18/2018

## 2021-06-17 NOTE — PROGRESS NOTES
Attempt 2: Care Guide called patient.  If this patient is returning my call, please transfer to Karena at ext [phone].    **No message left, automated message stating voice mail box is full**    Next Outreach: 04/30/2018

## 2021-06-17 NOTE — PROGRESS NOTES
"Chief Complaint   Patient presents with     Follow-up     BM was loose yesterday, today has not had a BM, sores previously on buttocks but states today she thinks they have healed but is itchy today     Vaginitis     vaginal itching, under breasts, under armpits x 2 months     feet     L foot feels like hard wires, R big toe hurting her     Leg Pain     L leg up from ankle hurting x 2 months     trouble urinating     thinks she needs ditropan. she states she has been dx with stress incontinence. she states when she gets up to go urine comes out       HPI: 59-year-old female who presents today for recheck of her chronic medical conditions including her diabetes, which is been poorly controlled and to which she does not appropriately treat, urinary incontinence, morbid obesity, hypertension and hyperlipidemia.  Her diabetes is poorly controlled, she has frequent incontinence where she urinates on herself, she has gained 23 pounds since her last visit according to her records.  She denies diabetic neuropathy, and she has not had a recent eye exam.  She has skin wounds which is treated by home care nurses.    ROS: She denies neuropathy.  No chest pain or shortness of breath.    SH: The Patient's   social history is negative for smoking    Meds:  Cele has a current medication list which includes the following prescription(s): amlodipine, atorvastatin, bumetanide, gabapentin, glipizide, lisinopril, metoprolol tartrate, omeprazole, potassium chloride, rivaroxaban, and senna-docusate.    O:  /82  Pulse 79  Temp 98.7  F (37.1  C)  Resp 24  Ht 5' 2\" (1.575 m)  Wt (!) 395 lb 3 oz (179.3 kg)  SpO2 94%  BMI 72.28 kg/m2     Lungs--Clear to Auscultation  Heart--Regular rate and rhythm  Abdomen--Soft, non-tender, non-distended  Skin-Pink and dry (not fully examined due to patient's morbid obesity and difficult exam)  Feet-examination of both feet show no evidence of open sores and no evidence of diabetic " neuropathy    A/P:   1. HTN (hypertension)  -Continue lisinopril  -Continue metoprolol  - HM2(CBC w/o Differential)    2. Hyperlipidemia  -Continue Lipitor  - Lipid Cascade    3. DM (diabetes mellitus)  -Strongly encourage her to check blood sugars.  She is aware of how to check blood sugars but simply does not care.  I will have our care manager visit with her as well to emphasize importance of diabetic care.  She continues on glipizide  - Basic Metabolic Panel  - Glycosylated Hemoglobin A1c    4. Morbid obesity  -Patient has no motivation to lose weight.  Encouraged her to lose weight.    5. Urinary incontinence  -Referral to urology    Return in 3 months

## 2021-06-18 NOTE — PROGRESS NOTES
[facility]    Cele Simon   67 y.o. female    Date of Visit: 5/31/2018    Chief Complaint   Patient presents with     Establish Care     needs briatric wheel chair     Rash     yeast infestion all over body     Subjective  This is the first visit to our office for this 67-year-old patient.  She had been seen on several occasions at the [facility] between November of last year and this month.  I have had an opportunity to review some of those notes and reports.  She is somewhat of a poor historian but reports a history of multiple medical problems that include hypertension, hyperlipidemia, type 2 diabetes, and ongoing issues with candidiasis involving skin folds of the breast and legs and vaginal area.  She did bring a log book in showing her sugars that are generally above 150 but usually below 230.  In reviewing the med list she appears to be only on glipizide for her diabetes at this point in time.  She also complains of a dry itchy scalp as well as the drainage and discomfort in her left ear.  She denies any particular issues with chest pain or shortness of breath.  She apparently has chronic obesity.  She relates a history of having had blood clots in her lungs last year and being in the hospital in the nursing homes for many months.  It was after she left the nursing home last fall that she came in to the other [facility] clinic.  She complains also of ongoing urinary incontinence is concerned about skin breakdowns, especially with her diabetes.  She is hoping that we can order some depends for her to protect the skin with her urinary incontinence.  Again, she is somewhat difficult and vague historian but seems to have no other specific complaints at this time.    Past history, social history have been reviewed and her older charts.    ROS A comprehensive review of systems was performed and was otherwise negative    Medications, allergies, and  problem list were reviewed and updated    Exam  General Appearance:   On examination today her blood pressure is 122/64.  She chose not to get on the scale but tells us she is around 390 pounds in weight.    Neck is supple with no masses and no obvious neck vein distention.    Lungs are clear    Heart is in a sinus rhythm with a rate of 88 and no ectopy.  I hear no gallops or murmurs.    Abdomen is not possible to examine because of her obesity.    We did not take a current look at the skin folds again due to her obesity and difficulty in moving around.    There seems to be just trace peripheral edema in both ankles.    The patient does seem to be alert and oriented ×3.      Assessment/Plan  1. DM (diabetes mellitus)  Glycosylated Hemoglobin A1c   2. Essential hypertension  Comprehensive Metabolic Panel   3. Hyperlipidemia, unspecified hyperlipidemia type  Lipid Cascade   4. Skin lesions     5. Memory loss       Hypertension.  Stable at this time.  Continue current medication.    Hyperlipidemia.  I would like to check lipids today.  We will also check a CMP.    Diabetes.  I am not sure that her diabetes is under good control but would like to do an A1c.  We may need to have her sit down with diabetic education to work on a good medication combination.    Skin fold lesions that are probably from candidiasis.  I see no reason not to give her some nystatin cream and powder to place on these areas as she tells me these have worked in the past.    Minor left otitis externa.  We will give her some Cortisporin drops for a few days.    Dry scalp.  We will try medicated shampoo for her.    I will follow-up with her regarding these issues if she chooses to return.  Total time of this office visit was 40 minutes with greater than 50% of the time spent in care coordination and patient counseling.  The following high BMI interventions were performed this visit: weight monitoring    Hamzah Zhao MD      Current Outpatient  Prescriptions on File Prior to Visit   Medication Sig     amLODIPine (NORVASC) 10 MG tablet Take 1 tablet (10 mg total) by mouth daily.     atorvastatin (LIPITOR) 40 MG tablet Take 1 tablet (40 mg total) by mouth at bedtime.     bumetanide (BUMEX) 2 MG tablet Take 0.5 tablets (1 mg total) by mouth daily.     gabapentin (NEURONTIN) 100 MG capsule Take 100 mg by mouth 2 (two) times a day. Two tabs two times daily     glipiZIDE (GLUCOTROL) 5 MG tablet Take 1 tablet (5 mg total) by mouth 2 (two) times a day before meals. Two tabs twice daily     lisinopril (PRINIVIL,ZESTRIL) 2.5 MG tablet Take 1 tablet (2.5 mg total) by mouth 2 (two) times a day.     metoprolol tartrate (LOPRESSOR) 50 MG tablet Take 1 tablet (50 mg total) by mouth Daily at 8:00 am..     omeprazole (PRILOSEC) 20 MG capsule Take 1 capsule (20 mg total) by mouth daily before breakfast.     potassium chloride (MICRO-K) 10 mEq CR capsule Take 1 capsule (10 mEq total) by mouth 2 (two) times a day.     rivaroxaban 20 mg Tab Take 1 tablet (20 mg total) by mouth daily with supper.     senna-docusate (PERICOLACE) 8.6-50 mg tablet Take 1 tablet by mouth daily.     No current facility-administered medications on file prior to visit.      Allergies   Allergen Reactions     Chocolate      Codeine      Dairy      Nylon      Wool      Social History   Substance Use Topics     Smoking status: Former Smoker     Quit date: 1991     Smokeless tobacco: Never Used     Alcohol use None

## 2021-06-18 NOTE — PROGRESS NOTES
Patient has established care at [facility]. Will transfer patient to [facility] Care Guide.

## 2021-06-18 NOTE — LETTER
Letter by Hamzah Zhao MD at      Author: Hamzah Zhao MD Service: -- Author Type: --    Filed:  Encounter Date: 1/30/2019 Status: (Other)       01/30/19      Cele Simon  [address]  [place] [address]    Dear Cele,    As a valued [facility] patient, your healthcare needs are our priority. We are contacting you in regards to an appointment with [facility]. Our clinic records indicate they have attempted to contact you to schedule the appointment, but have not heard back from you. Please contact their office to schedule your appointment at [phone]. If you are going somewhere else or no longer need this service please let us know at [phone].        Sincerely,  Specialty Scheduling

## 2021-06-19 NOTE — LETTER
Letter by Leonor Reis CHW at      Author: Leonor Reis CHW Service: -- Author Type: --    Filed:  Encounter Date: 5/16/2019 Status: (Other)         May 16, 2019          Cele Simon  [address]  [address]  [facility] [address]          Dear Cele,      After we met  to discuss your goals and how as a Care Guide I can work with you to keep you connected to your provider and care team, we both agreed that in order to best help give you the access, support and resources you need to reach your goals; and most importantly, for you to get and stay healthy, we would connect monthly.    I have tried to reach you for the past 6 months and have been unsuccessful. I have spoken with your primary care provider and at this time, we have decided to discontinue outreach calls.     If you feel that you are still in need of my services please call me at [phone].  If you reach my voicemail, please leave a message with your daytime telephone number and a date and time that I can call you.        Sincerely,    Leonor Reis, Clinic Care Guide  Clinic Care Coordination  [facility]   P) [phone] F) [phone]

## 2021-06-21 NOTE — PROGRESS NOTES
[facility] Follow Up Note    Cele Simon   67 y.o. female    Date of Visit: 10/29/2018    Chief Complaint   Patient presents with     Follow-up     pt states she is her for high blood sugar-pt is also has a skin infection under the skin inner right foot and top even in between her toes-pt states that she is really short of breath-inner thigh under left breast it itches and smells-pt c/o chronic fatigue     Subjective  This is a 67-year-old lady that I have only seen 1 time prior to this visit.  She also has been seen at another [facility] clinic.  She has type 2 diabetes, hypertension and hyperlipidemia.  She reports that her blood sugars in the past few weeks have been running consistently high.  She is not on very much medication and admits that she has not been as good with her diet as she should be.  When I last saw her we had talked about meeting with the diabetic educator but somehow this never occurred.  It is been several months since she actually was in the office.  She denies any headaches or dizziness.  She does talk about her bladder leakage and tells me that she had previously taken some Ditropan for this with some modest relief.  She wondered about restarting that.  She also briefly touches on some ongoing constipation.  She has skin issues and reports that she feels a scaly-like sensation underneath her feet and between the toes but she is never able to see any rash or feel anything when she touches the area.  She also complains of yeast type of infections below the breast and in the vaginal region.  She admits that she is difficult to examine because she does not fit onto a regular examination table and usually needs to be seen in a hospital setting where she can be examined on a larger gurney.  Overall she just tells me she does not feel very good.    ROS A comprehensive review of systems was performed and was otherwise negative    Medications, allergies, and problem  list were reviewed and updated    Exam  General Appearance:   On examination her blood pressure is 116/62.  Weight is 395 pounds and height is 62 inches.  BMI is 72.25.    Heart is in a sinus rhythm with a rate of 85 and no ectopy.    I am simply not able to examine any of the skin areas because of her marked obesity.    Examination of the abdomen is also impossible due to the morbid obesity.    The patient is alert and oriented x3.      Assessment/Plan  1. Essential hypertension  Comprehensive Metabolic Panel   2. Hyperlipidemia, unspecified hyperlipidemia type  Lipid Cascade   3. DM (diabetes mellitus) (H)  Glycosylated Hemoglobin A1c    Ambulatory referral to Diabetes Education Program (Existing Diagnosis)   4. Urinary incontinence       Type 2 diabetes.  Not particularly well controlled.  I would like to check an A1c and her lipids.  I also think we should set her up to see the diabetic educator.    Hypertension.  Stable.  Continue current medication.  I would like to check a CMP.    Hyperlipidemia.  As noted above we will check lipids.    For bladder leakage.  We will try her back on the oxybutynin and see what happens.    Possible yeast infections.  My only suggestion was that she be seen at a hospital setting where they have the ability to evaluate her which we cannot do here in the office.    I will follow-up with her regarding these issues.  Total time of this office visit was 25 minutes with greater than 50% of the time spent in care coordination of patient counseling.  The following high BMI interventions were performed this visit: weight monitoring    Hamzah Zhao MD      Current Outpatient Prescriptions on File Prior to Visit   Medication Sig     amLODIPine (NORVASC) 10 MG tablet TAKE 1 TABLET(10 MG) BY MOUTH DAILY     atorvastatin (LIPITOR) 40 MG tablet TAKE 1 TABLET(40 MG) BY MOUTH AT BEDTIME     bumetanide (BUMEX) 2 MG tablet Take 0.5 tablets (1 mg total) by mouth daily.      diphenoxylate-atropine (LOMOTIL) 2.5-0.025 mg per tablet Take 1 tablet by mouth 4 (four) times a day as needed for diarrhea.     gabapentin (NEURONTIN) 100 MG capsule Take 100 mg by mouth 2 (two) times a day. Two tabs two times daily     glipiZIDE (GLUCOTROL) 5 MG tablet TAKE 1 TABLET BY MOUTH TWICE DAILY BEFORE MEALS.     lisinopril (PRINIVIL,ZESTRIL) 2.5 MG tablet TAKE 1 TABLET(2.5 MG) BY MOUTH TWICE DAILY     metoprolol tartrate (LOPRESSOR) 50 MG tablet TAKE 1 TABLET BY MOUTH DAILY AT 8:00AM     nystatin (MYCOSTATIN) powder Apply to affected area 2 times daily     nystatin-triamcinolone (MYCOLOG) ointment Apply to affected area twice daily     omeprazole (PRILOSEC) 20 MG capsule Take 1 capsule (20 mg total) by mouth daily before breakfast.     potassium chloride (MICRO-K) 10 mEq CR capsule Take 1 capsule (10 mEq total) by mouth 2 (two) times a day.     rivaroxaban 20 mg Tab Take 1 tablet (20 mg total) by mouth daily with supper.     salicyclic acid-sulfur (SEBULEX) 2-2 % shampoo Apply every 3 days as needed.     senna-docusate (PERICOLACE) 8.6-50 mg tablet Take 1 tablet by mouth daily.     triamcinolone (KENALOG) 0.1 % ointment Apply topically daily.     No current facility-administered medications on file prior to visit.      Allergies   Allergen Reactions     Chocolate      Codeine      Dairy      Nylon      Wool      Social History   Substance Use Topics     Smoking status: Former Smoker     Quit date: 1991     Smokeless tobacco: Never Used     Alcohol use None

## 2021-06-22 NOTE — TELEPHONE ENCOUNTER
I am not comfortable in increasing the dose beyond this.  Our next step would be to have her discuss this with urology.

## 2021-06-22 NOTE — TELEPHONE ENCOUNTER
Who is calling:  Lakesha Carroll   Reason for Call:  Calling to clarify if this order is indeed for PT and OT or just for PT pool therapy. Diagnosis of Leg pain suggests only PT unless there is something else OT is ordered for? Please advise.   Date of last appointment with primary care: 10/29/18  Has the patient been recently seen:  Yes  Okay to leave a detailed message: Yes  Phone: [phone]

## 2021-06-22 NOTE — TELEPHONE ENCOUNTER
Medication Question or Clarification  Who is calling: Patient  What medication are you calling about?: oxybutynin (DITROPAN XL)   What dose do you take?: 10 MG ER tablet   How often are you taking the medication?: Take 1 tablet daily  Who prescribed the medication?: Dr. Zhao  What is your question/concern?: Patient is asking if she can increase her medication, this helps but patient still doesn't   Pharmacy: Dr. Zhao  Okay to leave a detailed message?: Yes [phone]  [facility] - Please call the pharmacy to obtain any additional needed information.     oxybutynin (DITROPAN XL) 10 MG ER tablet   Medication   Date: 10/29/2018 Department: [facility] Ordering/Authorizing: Hamzah Zhao MD   Order Providers   Prescribing Provider Encounter Provider   Hamzah Zhao MD Cathey, Thomas G, MD   Medication Detail    Disp Refills Start End    oxybutynin (DITROPAN XL) 10 MG ER tablet 90 tablet 2 10/29/2018     Sig: TAKE 1 TABLET(10 MG) BY MOUTH DAILY    Sent to pharmacy as: oxybutynin (DITROPAN XL) 10 MG ER tablet    Notes to Pharmacy: **Patient requests 90 days supply**    E-Prescribing Status: Receipt confirmed by pharmacy (10/29/2018  4:12 PM CDT)    Pharmacy   [facility] [address] [place] [address] [address] AT [facility] [address]

## 2021-06-22 NOTE — TELEPHONE ENCOUNTER
Pardeep Vines for orders requested below?  Please advise.  Thank you.  Balbina SOLIMAN, MARK/MARY....................11:07 AM

## 2021-06-22 NOTE — TELEPHONE ENCOUNTER
Refill Approved    Rx renewed per Medication Renewal Policy. Medication was last renewed on 10/3/18.    Chasity Pham, [facility] Triage/Med Refill 1/7/2019     Requested Prescriptions   Pending Prescriptions Disp Refills     atorvastatin (LIPITOR) 40 MG tablet [Pharmacy Med Name: ATORVASTATIN 40MG TABLETS] 90 tablet 0     Sig: TAKE 1 TABLET BY MOUTH EVERY NIGHT AT BEDTIME    Statins Refill Protocol (Hmg CoA Reductase Inhibitors) Passed - 1/6/2019  3:09 PM       Passed - PCP or prescribing provider visit in past 12 months     Last office visit with prescriber/PCP: 10/29/2018 Hamzah Zhao MD OR same dept: 5/3/2018 Bird Mcmullen MD OR same specialty: 5/3/2018 Bird Mcmullen MD  Last physical: Visit date not found Last MTM visit: Visit date not found   Next visit within 3 mo: Visit date not found  Next physical within 3 mo: Visit date not found  Prescriber OR PCP: Hamzah Zhao MD  Last diagnosis associated with med order: 1. Hyperlipidemia  - atorvastatin (LIPITOR) 40 MG tablet [Pharmacy Med Name: ATORVASTATIN 40MG TABLETS]; TAKE 1 TABLET BY MOUTH EVERY NIGHT AT BEDTIME  Dispense: 90 tablet; Refill: 0    If protocol passes may refill for 12 months if within 3 months of last provider visit (or a total of 15 months).

## 2021-06-22 NOTE — TELEPHONE ENCOUNTER
Orders being requested: U-Pads 23 x 36 10 each in a package, 300 per month. Patient called them 'chucks' the flat disposable pads.  Reason service is needed/diagnosis: Incontinence  When are orders needed by: Today, patient is out of pads.  Where to send Orders: Phone:  [phone] [facility]   Okay to leave detailed message?  Yes [phone]    Patient is having difficulty emptying her bladder on the toilet, when she lays down bladder in emptying and soaking through several pads.

## 2021-06-22 NOTE — PROGRESS NOTES
Patient called regarding a few items that she is having difficulty getting and her  is on vacation until the 23rd of Jan.   Care Connection transferred patient to Care Guide. She is a [facility] patient that was enrolled at the [facility]. Patient was supposed to be transferred but care guide was not notified.    Patient needs her underpads from [facility] asap. It looks like they were sent to the  clinic instead of here and were not addressed at that site.   Care guide called [facility] and made sure they had the correct PCP and contact information.    Next outreach due: 1/22/19

## 2021-06-22 NOTE — TELEPHONE ENCOUNTER
Pardeep Vines for orders requested?  Orders placed in your in box to review.    Please advise.  Thank you.  Balbina SOLIMAN CMA/MARY....................11:06 AM

## 2021-06-22 NOTE — TELEPHONE ENCOUNTER
Dr. Zhao,  Please review message below about medication increase and advise.  Patient is aware that Dr. Zhao is out of the clinic until Wednesday and is okay with waiting until his return for him to review message.  Thank you.  Balbina SOLIMAN CMA/MARY....................11:10 AM

## 2021-06-22 NOTE — TELEPHONE ENCOUNTER
Spoke with patient- she did not see urology when order was placed in 5/2018    Will place new referral- advised they will contact her to schedule.     She also needs her incontinence U-Pads sent in- she has been out for a week- she does not know which company she uses. States it's not [employer]- and she spke with care guide Briana regarding this yesterday. Will discuss with her further to assist patient.

## 2021-06-22 NOTE — TELEPHONE ENCOUNTER
Orders being requested: PT, OT evaluate and treat, pool therapy, possibly.  Reason service is needed/diagnosis: Patient had previously done pool therapy at [facility]. Patient is having trouble getting around an  When are orders needed by: As soon possible  Where to send Orders: Please send orders to [facility].  Okay to leave detailed message?  Yes [phone]

## 2021-06-22 NOTE — TELEPHONE ENCOUNTER
Spoke with the patient and relayed the message below from Dr. Greer.  She verbalized understanding and had no further questions at this time.  Orders have been faxed to [facility] in [place] at [phone].    Balbina SOLIMAN CMA/CMT....................2:39 PM

## 2021-06-23 NOTE — PROGRESS NOTES
Attempt 1-Care Guide called patient.  If this patient is returning our call please transfer to Leonor at ext. [phone]    Next outreach due: 2/8/19

## 2021-06-23 NOTE — TELEPHONE ENCOUNTER
RN cannot approve Refill Request    RN can NOT refill this medication med is not covered by policy/route to provider. Last office visit: 10/29/2018 Hamzah Zhao MD Last Physical: Visit date not found Last MTM visit: Visit date not found Last visit same specialty: 10/29/2018 Hamzah Zhao MD.  Next visit within 3 mo: Visit date not found  Next physical within 3 mo: Visit date not found      Maria Teresa Galindo, [facility] Triage/Med Refill 1/23/2019    Requested Prescriptions   Pending Prescriptions Disp Refills     XARELTO 20 mg Tab [Pharmacy Med Name: XARELTO 20MG TABLETS] 90 tablet 0     Sig: TAKE 1 TABLET(20 MG) BY MOUTH DAILY WITH SUPPER    Apixaban/Rivaroxaban/Dabigatran Refill Protocol Failed - 1/23/2019 12:40 PM       Failed - Route to appropriate pool/provider    Last Anticoagulation Summary:          Passed - Renal function test in last year    Creatinine   Date Value Ref Range Status   10/29/2018 1.57 (H) 0.60 - 1.10 mg/dL Final            Passed - PCP or prescribing provider visit in past 12 months      Last office visit with prescriber/PCP: 10/29/2018 Hamzah Zhao MD OR same dept: 10/29/2018 Hamzah Zhao MD OR same specialty: 10/29/2018 Hamzah Zhao MD  Last physical: Visit date not found Last MTM visit: Visit date not found   Next visit within 3 mo: Visit date not found  Next physical within 3 mo: Visit date not found  Prescriber OR PCP: Hamzah Zhao MD  Last diagnosis associated with med order: There are no diagnoses linked to this encounter.  If protocol passes may refill for 12 months if within 3 months of last provider visit (or a total of 15 months).

## 2021-06-23 NOTE — TELEPHONE ENCOUNTER
RN cannot approve Refill Request    RN can NOT refill this medication medication not on med list. Last office visit: 10/29/2018 Hamzah Zhao MD Last Physical: Visit date not found Last MTM visit: Visit date not found Last visit same specialty: 10/29/2018 Hamzah Zhao MD.  Next visit within 3 mo: Visit date not found  Next physical within 3 mo: Visit date not found      Melly Pro, [facility] Triage/Med Refill 2/1/2019    Requested Prescriptions   Pending Prescriptions Disp Refills     MICROLET LANCET [Pharmacy Med Name: MICROLET COLORED LANCETS 100'S] 100 each 0     Sig: USE AS DIRECTED    Diabetic Supplies Refill Protocol Passed - 1/31/2019  1:07 PM       Passed - Visit with PCP or prescribing provider visit in last 6 months    Last office visit with prescriber/PCP: 10/29/2018 Hamzah Zhao MD OR same dept: 10/29/2018 Hamzah Zhao MD OR same specialty: 10/29/2018 Hamzah Zhao MD  Last physical: Visit date not found Last MTM visit: Visit date not found   Next visit within 3 mo: Visit date not found  Next physical within 3 mo: Visit date not found  Prescriber OR PCP: Hamzah Zhao MD  Last diagnosis associated with med order: There are no diagnoses linked to this encounter.  If protocol passes may refill for 12 months if within 3 months of last provider visit (or a total of 15 months).            Passed - A1C in last 6 months    Hemoglobin A1c   Date Value Ref Range Status   10/29/2018 10.0 (H) 3.5 - 6.0 % Final

## 2021-06-24 NOTE — PROGRESS NOTES
Attempt 2-Care Guide called patient.  If this patient is returning our call please transfer to Leonor at ext. [phone].     Next outreach due: 3/11/19

## 2021-06-24 NOTE — PROGRESS NOTES
Care Guide called patient.  If this patient is returning our call please transfer to Leonor at ext. [phone].   I have called (patient) 3 times over the past two weeks and have been unsuccessful in reaching them. This patient has also not returned any of my messages.     I will continue attempting to reach out to this patient in one month. I will also check this patient s chart for upcoming appointments, ER reports that may contain a new phone number, or any other recent activity.     Next outreach due: 4/15/19

## 2021-06-24 NOTE — PROGRESS NOTES
Patient has missed many Diab Ed appointments, she states she has chronic fatigue and it's hard to get up and ready for appointments.  Care guide sent message to DE team to reschedule with patient.    Patient is on a CADI waiver and is getting housekeeping, Nurse visits and an aide.   She is in touch with her  regularly regarding her needs.     Next outreach due: 2/28/19

## 2021-11-19 NOTE — PROGRESS NOTES
Clinic Care Coordination Contact  Care Team Conversations     TALON CAMARA received call back from SW at [facility]. She reported that patient has an apartment lined up to move into in mid-September and they are unable to afford the apartment without son's pca income. He is currently PCA for patient. This is a major stressor for her. [facility] reported they are working with nursing director and doctor to determine next steps because patient is planning on signing out AMA.    Patient is not wanting [facility] SW to be involved in her care at this time due to frustration with recommendations being to remain in [facility]. TALON CAMARA does not feel it would be helpful at this time for them to contact patient.    [facility] SW informed clinic RN supervisor of this information and at this time TALON CAMARA will not do further follow up.        Negative

## 2024-11-09 NOTE — TELEPHONE ENCOUNTER
Called to let pt know she is due for a DM follow up with Dr Zhao.  Who answered the phone said she has been in [employer] for about 1 week.    Dena Fox CMA ([facility])     Telemetry